# Patient Record
Sex: FEMALE | Race: WHITE | NOT HISPANIC OR LATINO | Employment: UNEMPLOYED | URBAN - NONMETROPOLITAN AREA
[De-identification: names, ages, dates, MRNs, and addresses within clinical notes are randomized per-mention and may not be internally consistent; named-entity substitution may affect disease eponyms.]

---

## 2020-08-20 ENCOUNTER — APPOINTMENT (EMERGENCY)
Dept: RADIOLOGY | Facility: HOSPITAL | Age: 55
End: 2020-08-20
Payer: COMMERCIAL

## 2020-08-20 ENCOUNTER — APPOINTMENT (EMERGENCY)
Dept: CT IMAGING | Facility: HOSPITAL | Age: 55
End: 2020-08-20
Payer: COMMERCIAL

## 2020-08-20 ENCOUNTER — HOSPITAL ENCOUNTER (EMERGENCY)
Facility: HOSPITAL | Age: 55
Discharge: HOME | End: 2020-08-20
Attending: EMERGENCY MEDICINE | Admitting: EMERGENCY MEDICINE
Payer: COMMERCIAL

## 2020-08-20 VITALS
OXYGEN SATURATION: 99 % | BODY MASS INDEX: 27.14 KG/M2 | HEIGHT: 66 IN | SYSTOLIC BLOOD PRESSURE: 128 MMHG | TEMPERATURE: 97.9 F | DIASTOLIC BLOOD PRESSURE: 78 MMHG | HEART RATE: 82 BPM | WEIGHT: 168.87 LBS | RESPIRATION RATE: 18 BRPM

## 2020-08-20 DIAGNOSIS — S29.9XXA RIB INJURY: ICD-10-CM

## 2020-08-20 DIAGNOSIS — S09.90XA HEAD INJURY: Primary | ICD-10-CM

## 2020-08-20 PROCEDURE — 99284 EMERGENCY DEPT VISIT MOD MDM: CPT

## 2020-08-20 PROCEDURE — G1004 CDSM NDSC: HCPCS

## 2020-08-20 PROCEDURE — 71046 X-RAY EXAM CHEST 2 VIEWS: CPT

## 2020-08-20 PROCEDURE — 99284 EMERGENCY DEPT VISIT MOD MDM: CPT | Performed by: EMERGENCY MEDICINE

## 2020-08-20 PROCEDURE — 70450 CT HEAD/BRAIN W/O DYE: CPT

## 2020-08-20 RX ORDER — ACETAMINOPHEN 325 MG/1
975 TABLET ORAL ONCE
Status: COMPLETED | OUTPATIENT
Start: 2020-08-20 | End: 2020-08-20

## 2020-08-20 RX ORDER — LIDOCAINE 50 MG/G
1 PATCH TOPICAL DAILY
Qty: 10 PATCH | Refills: 0 | Status: SHIPPED | OUTPATIENT
Start: 2020-08-20 | End: 2021-06-01

## 2020-08-20 RX ORDER — LEVOTHYROXINE SODIUM 0.15 MG/1
TABLET ORAL
COMMUNITY
Start: 2020-04-18

## 2020-08-20 RX ORDER — LIDOCAINE 50 MG/G
PATCH TOPICAL
COMMUNITY
Start: 2020-07-10 | End: 2021-06-01

## 2020-08-20 RX ORDER — ATORVASTATIN CALCIUM 40 MG/1
40 TABLET, FILM COATED ORAL DAILY
COMMUNITY
Start: 2020-05-25

## 2020-08-20 RX ORDER — GABAPENTIN 300 MG/1
CAPSULE ORAL
COMMUNITY
Start: 2020-04-18

## 2020-08-20 RX ORDER — FAMOTIDINE 20 MG/1
20 TABLET, FILM COATED ORAL 2 TIMES DAILY
COMMUNITY

## 2020-08-20 RX ORDER — MORPHINE SULFATE 15 MG/1
15 TABLET ORAL EVERY 8 HOURS PRN
Qty: 8 TABLET | Refills: 0 | Status: SHIPPED | OUTPATIENT
Start: 2020-08-20 | End: 2021-06-01

## 2020-08-20 RX ADMIN — ACETAMINOPHEN 975 MG: 325 TABLET ORAL at 10:59

## 2020-08-20 NOTE — Clinical Note
spouse accompanied Servando Marcelino to the emergency department on 8/20/2020  They may return to work on 08/21/2020  If you have any questions or concerns, please don't hesitate to call        Silke Contreras, DO

## 2020-08-20 NOTE — ED NOTES
Pt amb to BR with this RN, gait is slow and steady, pt denies feeling dizzy or lightheaded, returned to room, resting comfortably on litter, no distress noted, sig other at bedside       Adry Pulliam RN  08/20/20 3393

## 2020-11-30 NOTE — ED PROVIDER NOTES
History  Chief Complaint   Patient presents with    Fall     pt was stepping over dog, lost balance falling on left side hitting head and ribs 1hr ago  pt c/o left head pain w/abrasion and left rib pain  hx recent rib injury  denies loc/travel/sob/cough/fevers/n/v/d     Left ribs still hurt after fall about a month ago and reinjured when she fell over dog this morning and also bumped head, no prodrome, little dizziness, notes she is taking Tylenol Arthritis for left ankle pain chronically which also helps her rib discomfort, initially seen by family physician and had a chest x-ray, given pain prescription which she used common no other complaints      History provided by:  Patient and spouse  Medical Problem   Location:  Left ribs  Quality:  Ache, sharp  Onset quality:  Sudden  Timing:  Constant  Progression:  Partially resolved  Chronicity:  New  Associated symptoms: headaches    Associated symptoms: no abdominal pain, no cough, no diarrhea, no fever, no loss of consciousness, no rash, no shortness of breath, no sore throat and no vomiting        Prior to Admission Medications   Prescriptions Last Dose Informant Patient Reported? Taking?   atorvastatin (LIPITOR) 40 mg tablet   Yes Yes   Sig: Take 40 mg by mouth daily   famotidine (PEPCID) 20 mg tablet   Yes No   Sig: Take 20 mg by mouth 2 (two) times a day   gabapentin (NEURONTIN) 300 mg capsule   Yes Yes   Sig: TAKE 1 CAPSULE BY MOUTH IN THE MORNING AND AFTERNOON AND 2 CAPSULES AT BEDTIME   levothyroxine 150 mcg tablet   Yes Yes   Sig: TAKE 1 TABLET BY MOUTH EVERY DAY AT LEAST 30 MINUTES PRIOR TO BREAKFAST OR OTHER MEDS   lidocaine (LIDODERM) 5 %   Yes Yes   Sig: PLACE 1 PATCH TOPICALLY ON THE SKIN DAILY  FOR RIB FRACTURE      Facility-Administered Medications: None       History reviewed  No pertinent past medical history  History reviewed  No pertinent surgical history  History reviewed  No pertinent family history    I have reviewed and agree with the history as documented  E-Cigarette/Vaping    E-Cigarette Use Never User      E-Cigarette/Vaping Substances     Social History     Tobacco Use    Smoking status: Current Every Day Smoker     Packs/day: 0 50     Types: Cigarettes    Smokeless tobacco: Never Used   Substance Use Topics    Alcohol use: Yes     Frequency: Monthly or less     Drinks per session: 1 or 2     Binge frequency: Less than monthly    Drug use: Never       Review of Systems   Constitutional: Negative for fever  HENT: Negative for sore throat  Respiratory: Negative for cough and shortness of breath  Gastrointestinal: Negative for abdominal pain, diarrhea and vomiting  Skin: Negative for rash  Neurological: Positive for headaches  Negative for loss of consciousness  All other systems reviewed and are negative  Physical Exam  Physical Exam  Vitals signs and nursing note reviewed  Constitutional:       Comments: Pleasant, comfortable-appearing   HENT:      Head: Normocephalic and atraumatic  Eyes:      Conjunctiva/sclera: Conjunctivae normal       Pupils: Pupils are equal, round, and reactive to light  Neck:      Musculoskeletal: Neck supple  Cardiovascular:      Rate and Rhythm: Normal rate and regular rhythm  Heart sounds: Normal heart sounds  Pulmonary:      Effort: Pulmonary effort is normal       Breath sounds: Normal breath sounds  Abdominal:      General: Bowel sounds are normal  There is no distension  Palpations: Abdomen is soft  Tenderness: There is no abdominal tenderness  Musculoskeletal: Normal range of motion  Comments: Left anterior inferior ribs with overlying pain patch are locally tender, without deformity   Skin:     General: Skin is warm and dry  Neurological:      Mental Status: She is alert and oriented to person, place, and time  Cranial Nerves: No cranial nerve deficit        Coordination: Coordination normal    Psychiatric:         Behavior: Behavior normal  Thought Content: Thought content normal          Judgment: Judgment normal          Vital Signs  ED Triage Vitals [08/20/20 0917]   Temperature Pulse Respirations Blood Pressure SpO2   97 9 °F (36 6 °C) 74 18 153/88 99 %      Temp Source Heart Rate Source Patient Position - Orthostatic VS BP Location FiO2 (%)   Temporal Monitor Lying Left arm --      Pain Score       7           Vitals:    08/20/20 0917 08/20/20 1100   BP: 153/88 128/78   Pulse: 74 82   Patient Position - Orthostatic VS: Lying Lying         Visual Acuity  Visual Acuity      Most Recent Value   L Pupil Size (mm)  3   R Pupil Size (mm)  3          ED Medications  Medications   acetaminophen (TYLENOL) tablet 975 mg (975 mg Oral Given 8/20/20 1059)       Diagnostic Studies  Results Reviewed     None                 XR chest 2 views   Final Result by Arty Dandy, MD (08/20 1008)      Question minimally displaced fracture of the lateral left 6th rib with no pneumothorax or hemothorax  The study was marked in [facility] for immediate notification  Workstation performed: [id number]         CT head without contrast   Final Result by Ralf Sanchez MD (08/20 1021)      No acute intracranial abnormality  Workstation performed: [id number]                    Procedures  Procedures         ED Course  ED Course as of Aug 20 1105   Thu Aug 20, 2020   1054 IMPRESSION:     Question minimally displaced fracture of the lateral left 6th rib with no pneumothorax or hemothorax      The study was marked in [employer] for immediate notification          XR chest 2 views   1104 We discussed xr results and close outpatient f/u, spouse present and voices good understanding, requests more lidoderm          US AUDIT      Most Recent Value   Initial Alcohol Screen: US AUDIT-C    1  How often do you have a drink containing alcohol? 1 Filed at: 08/20/2020 0918   2   How many drinks containing alcohol do you have on a typical day you are drinking?   0 Filed at: 08/20/2020 0918   3b  FEMALE Any Age, or MALE 65+: How often do you have 4 or more drinks on one occassion? 1 Filed at: 08/20/2020 1802   Audit-C Score  2 Filed at: 08/20/2020 1792                  ZACKARY/DAST-10      Most Recent Value   How many times in the past year have you    Used an illegal drug or used a prescription medication for non-medical reasons? Never Filed at: 08/20/2020 9859                                MDM      Disposition  Final diagnoses:   Head injury   Rib injury     Time reflects when diagnosis was documented in both MDM as applicable and the Disposition within this note     Time User Action Codes Description Comment    8/20/2020 10:59 AM Clarissa Newby Add [S09 90XA] Head injury     8/20/2020 11:00 AM Clarissa Newby Add [S29  9XXA] Rib injury       ED Disposition     ED Disposition Condition Date/Time Comment    Discharge Stable Thu Aug 20, 2020 10:59 AM Kriste Cooks discharge to home/self care  Follow-up Information     Follow up With Specialties Details Why Contact Info    Suzy Shaw MD Internal Medicine   [address] [place] [address]  [phone]            Patient's Medications   Discharge Prescriptions    LIDOCAINE (LIDODERM) 5 %    Apply 1 patch topically daily for 5 days Remove & Discard patch within 12 hours or as directed by MD       Start Date: 8/20/2020 End Date: 8/25/2020       Order Dose: 1 patch       Quantity: 10 patch    Refills: 0    MORPHINE (MSIR) 15 MG TABLET    Take 1 tablet (15 mg total) by mouth every 8 (eight) hours as needed for severe pain for up to 3 dosesMax Daily Amount: 45 mg       Start Date: 8/20/2020 End Date: --       Order Dose: 15 mg       Quantity: 8 tablet    Refills: 0     No discharge procedures on file      PDMP Review     None          ED Provider  Electronically Signed by           Denisse Martinez DO  08/20/20 6703 [employer] beliefs/[employer]

## 2021-06-01 ENCOUNTER — HOSPITAL ENCOUNTER (EMERGENCY)
Facility: HOSPITAL | Age: 56
Discharge: HOME | End: 2021-06-01
Attending: EMERGENCY MEDICINE
Payer: COMMERCIAL

## 2021-06-01 ENCOUNTER — APPOINTMENT (EMERGENCY)
Dept: RADIOLOGY | Facility: HOSPITAL | Age: 56
End: 2021-06-01
Payer: COMMERCIAL

## 2021-06-01 VITALS
WEIGHT: 171.2 LBS | OXYGEN SATURATION: 97 % | HEART RATE: 89 BPM | DIASTOLIC BLOOD PRESSURE: 69 MMHG | RESPIRATION RATE: 18 BRPM | HEIGHT: 66 IN | BODY MASS INDEX: 27.51 KG/M2 | TEMPERATURE: 97.3 F | SYSTOLIC BLOOD PRESSURE: 137 MMHG

## 2021-06-01 DIAGNOSIS — S62.652A CLOSED NONDISPLACED FRACTURE OF MIDDLE PHALANX OF RIGHT MIDDLE FINGER, INITIAL ENCOUNTER: Primary | ICD-10-CM

## 2021-06-01 PROCEDURE — 73130 X-RAY EXAM OF HAND: CPT

## 2021-06-01 PROCEDURE — 29125 APPL SHORT ARM SPLINT STATIC: CPT | Performed by: EMERGENCY MEDICINE

## 2021-06-01 PROCEDURE — 99284 EMERGENCY DEPT VISIT MOD MDM: CPT | Performed by: EMERGENCY MEDICINE

## 2021-06-01 PROCEDURE — 99283 EMERGENCY DEPT VISIT LOW MDM: CPT

## 2021-06-01 RX ORDER — OXYCODONE HYDROCHLORIDE AND ACETAMINOPHEN 5; 325 MG/1; MG/1
1 TABLET ORAL EVERY 8 HOURS PRN
Qty: 10 TABLET | Refills: 0 | Status: SHIPPED | OUTPATIENT
Start: 2021-06-01 | End: 2021-06-11

## 2021-06-01 NOTE — ED PROVIDER NOTES
History  Chief Complaint   Patient presents with    Finger Injury     pt states at 1300 she was trying to get her dog into the house, put her fingers under its collar, and the dog pulled her  c/o right 3rd and 4th finger pain with swelling since then      Patient complains of right 3rd finger pain after she grabbed her dog by the collar and he pulled her hand  She complains of pain in the middle and ring fingers  No other injuries or complaints  History provided by:  Patient   used: No    Hand Pain  Location:  Right middle and ring fingers  Quality:  Pain and swelling  Severity:  Moderate  Onset quality:  Sudden  Timing:  Constant  Progression:  Unchanged  Chronicity:  New  Relieved by:  Nothing  Worsened by:  Palpation, movement  Associated symptoms: no abdominal pain, no chest pain, no congestion, no cough, no diarrhea, no ear pain, no fatigue, no fever, no headaches, no loss of consciousness, no nausea, no rash, no shortness of breath, no sore throat, no vomiting and no wheezing        Prior to Admission Medications   Prescriptions Last Dose Informant Patient Reported? Taking?   atorvastatin (LIPITOR) 40 mg tablet   Yes No   Sig: Take 40 mg by mouth daily   famotidine (PEPCID) 20 mg tablet   Yes No   Sig: Take 20 mg by mouth 2 (two) times a day   gabapentin (NEURONTIN) 300 mg capsule   Yes No   Sig: TAKE 1 CAPSULE BY MOUTH IN THE MORNING AND AFTERNOON AND 2 CAPSULES AT BEDTIME   levothyroxine 150 mcg tablet   Yes No   Sig: TAKE 1 TABLET BY MOUTH EVERY DAY AT LEAST 30 MINUTES PRIOR TO BREAKFAST OR OTHER MEDS      Facility-Administered Medications: None       Past Medical History:   Diagnosis Date    Disease of thyroid gland     GERD (gastroesophageal reflux disease)     High cholesterol        Past Surgical History:   Procedure Laterality Date    ANKLE SURGERY Left     FOOT SURGERY Left     HYSTERECTOMY      SHOULDER SURGERY Right        History reviewed   No pertinent family history  I have reviewed and agree with the history as documented  E-Cigarette/Vaping    E-Cigarette Use Never User      E-Cigarette/Vaping Substances     Social History     Tobacco Use    Smoking status: Current Every Day Smoker     Packs/day: 0 50     Types: Cigarettes    Smokeless tobacco: Never Used   Substance Use Topics    Alcohol use: Not Currently     Frequency: Monthly or less     Drinks per session: 1 or 2     Binge frequency: Less than monthly    Drug use: Never       Review of Systems   Constitutional: Negative for chills, fatigue and fever  HENT: Negative for congestion, ear pain, hearing loss, sore throat, trouble swallowing and voice change  Eyes: Negative for pain and discharge  Respiratory: Negative for cough, shortness of breath and wheezing  Cardiovascular: Negative for chest pain and palpitations  Gastrointestinal: Negative for abdominal pain, blood in stool, constipation, diarrhea, nausea and vomiting  Genitourinary: Negative for dysuria, flank pain, frequency and hematuria  Musculoskeletal: Negative for joint swelling, neck pain and neck stiffness  Skin: Negative for rash and wound  Neurological: Negative for dizziness, seizures, loss of consciousness, syncope, facial asymmetry and headaches  Psychiatric/Behavioral: Negative for hallucinations, self-injury and suicidal ideas  All other systems reviewed and are negative  Physical Exam  Physical Exam  Vitals signs and nursing note reviewed  Constitutional:       General: She is not in acute distress  Appearance: She is well-developed  She is not ill-appearing or diaphoretic  HENT:      Head: Normocephalic and atraumatic  Right Ear: External ear normal       Left Ear: External ear normal    Eyes:      General: No scleral icterus  Right eye: No discharge  Left eye: No discharge  Extraocular Movements: Extraocular movements intact        Conjunctiva/sclera: Conjunctivae normal  Neck:      Musculoskeletal: Normal range of motion and neck supple  Pulmonary:      Effort: Pulmonary effort is normal  No respiratory distress  Musculoskeletal: Normal range of motion  General: No deformity or signs of injury  Comments: Right 3rd and 4th digits are swollen  Right 3rd digit is tender to palpation  Movement in these digits is limited by pain  Distal neurovascular function is normal   Remainder of the hand is normal    Skin:     General: Skin is warm and dry  Coloration: Skin is not jaundiced or pale  Neurological:      General: No focal deficit present  Mental Status: She is alert and oriented to person, place, and time  Cranial Nerves: No cranial nerve deficit  Coordination: Coordination normal       Gait: Gait normal    Psychiatric:         Mood and Affect: Mood normal          Behavior: Behavior normal          Thought Content: Thought content normal          Judgment: Judgment normal          Vital Signs  ED Triage Vitals [06/01/21 1621]   Temperature Pulse Respirations Blood Pressure SpO2   (!) 97 3 °F (36 3 °C) 89 18 137/69 97 %      Temp Source Heart Rate Source Patient Position - Orthostatic VS BP Location FiO2 (%)   Temporal Monitor Sitting Right arm --      Pain Score       --           Vitals:    06/01/21 1621   BP: 137/69   Pulse: 89   Patient Position - Orthostatic VS: Sitting         Visual Acuity      ED Medications  Medications - No data to display    Diagnostic Studies  Results Reviewed     None                 XR hand 3+ views RIGHT   ED Interpretation by Dona Rosen MD (06/01 3554)                 Procedures  Splint application    Date/Time: 6/1/2021 4:44 PM  Performed by: Dona Rosen MD  Authorized by: Dona Rosen MD   Universal Protocol:  Consent: Verbal consent obtained  Written consent not obtained    Consent given by: patient  Patient identity confirmed: verbally with patient and arm band      Procedure details: Laterality:  Right    Location:  Hand    Hand:  R hand    Strapping: no      Splint type:  Short arm splint, static (forearm to hand)    Supplies:  Cotton padding, Ortho-Glass and 2 layer wrap  Post-procedure details:     Pain:  Unchanged    Sensation:  Normal    Patient tolerance of procedure: Tolerated well, no immediate complications             ED Course                                           MDM  Number of Diagnoses or Management Options     Amount and/or Complexity of Data Reviewed  Tests in the radiology section of CPT®: ordered and reviewed        Disposition  Final diagnoses:   Closed nondisplaced fracture of middle phalanx of right middle finger, initial encounter     Time reflects when diagnosis was documented in both MDM as applicable and the Disposition within this note     Time User Action Codes Description Comment    6/1/2021  5:00 PM Montez Beaver Add [E20 961Y] Closed nondisplaced fracture of middle phalanx of right middle finger, initial encounter       ED Disposition     ED Disposition Condition Date/Time Comment    Discharge Stable Tue Jun 1, 2021  4:59 PM Zoe Hurtaod discharge to home/self care  Follow-up Information     Follow up With Specialties Details Why Contact Info    Dami Massey MD Internal Medicine   [address] Orthopedic Surgery In 2 days  [address] [place] [address]  [phone]            Patient's Medications   Discharge Prescriptions    OXYCODONE-ACETAMINOPHEN (PERCOCET) 5-325 MG PER TABLET    Take 1 tablet by mouth every 8 (eight) hours as needed for moderate pain for up to 10 daysMax Daily Amount: 3 tablets       Start Date: 6/1/2021  End Date: 6/11/2021       Order Dose: 1 tablet       Quantity: 10 tablet    Refills: 0     No discharge procedures on file      PDMP Review     None          ED Provider  Electronically Signed by           Serenity Brody MD  06/01/21 6428

## 2021-06-02 ENCOUNTER — OFFICE VISIT (OUTPATIENT)
Dept: OBGYN CLINIC | Facility: CLINIC | Age: 56
End: 2021-06-02
Payer: COMMERCIAL

## 2021-06-02 VITALS
BODY MASS INDEX: 27.51 KG/M2 | WEIGHT: 171.2 LBS | SYSTOLIC BLOOD PRESSURE: 118 MMHG | DIASTOLIC BLOOD PRESSURE: 60 MMHG | HEIGHT: 66 IN | HEART RATE: 90 BPM

## 2021-06-02 DIAGNOSIS — M79.644 FINGER PAIN, RIGHT: ICD-10-CM

## 2021-06-02 DIAGNOSIS — S62.652A CLOSED NONDISPLACED FRACTURE OF MIDDLE PHALANX OF RIGHT MIDDLE FINGER, INITIAL ENCOUNTER: Primary | ICD-10-CM

## 2021-06-02 PROCEDURE — 99203 OFFICE O/P NEW LOW 30 MIN: CPT | Performed by: ORTHOPAEDIC SURGERY

## 2021-06-02 NOTE — PROGRESS NOTES
ASSESSMENT/PLAN:    Diagnoses and all orders for this visit:    Finger pain, right    Closed nondisplaced fracture of middle phalanx of right middle finger, initial encounter          X-rays performed in the ED reviewed with the patient  Index and middle fingers of the right hand were buddy-taped together  The buddy tape should remain at all times but she may change the tape as needed  She was instructed on how to safely do so today  We will see her back in 1 week for recheck  X-rays of the middle finger will be obtained upon arrival   She was encouraged to contact the office should questions or concerns arise  Return in about 1 week (around 6/9/2021)  _____________________________________________________  CHIEF COMPLAINT:  Chief Complaint   Patient presents with   [address] [place] [address] Patient Visit     went to grab her dog by the collar and her right fingers are swollen and bruised  states that her middle finger is broken  SUBJECTIVE:  Thomas King is a 50 y o  female who presents for evaluation of right finger fracture  She was grabbing her dog by the collar yesterday morning when he pulled her  She noted   Pain in the middle finger and progressive swelling that worsened throughout the day  She presented to the ED  In the late afternoon for evaluation  X-rays were obtained and were read positive for fracture  She was put into a splint and asked to follow up with Orthopedics  Today, she states she has continued pain in the finger  She has been using OTC analgesics  She was prescribed Percocet by the ED but her insurance requires a prior off of for approving it  She reports a prior boxer's fracture in the  Right 5th metacarpal more than 20 years ago  She denies numbness or tingling  She is left-hand dominant      PAST MEDICAL HISTORY:  Past Medical History:   Diagnosis Date    Disease of thyroid gland     GERD (gastroesophageal reflux disease)     High cholesterol        PAST SURGICAL HISTORY:  Past Surgical History:   Procedure Laterality Date    ANKLE SURGERY Left     FOOT SURGERY Left     HYSTERECTOMY      SHOULDER SURGERY Right        FAMILY HISTORY:  History reviewed  No pertinent family history  SOCIAL HISTORY:  Social History     Tobacco Use    Smoking status: Current Every Day Smoker     Packs/day: 0 50     Types: Cigarettes    Smokeless tobacco: Never Used   Substance Use Topics    Alcohol use: Not Currently     Frequency: Monthly or less     Drinks per session: 1 or 2     Binge frequency: Less than monthly    Drug use: Never       MEDICATIONS:    Current Outpatient Medications:     atorvastatin (LIPITOR) 40 mg tablet, Take 40 mg by mouth daily, Disp: , Rfl:     famotidine (PEPCID) 20 mg tablet, Take 20 mg by mouth 2 (two) times a day, Disp: , Rfl:     gabapentin (NEURONTIN) 300 mg capsule, TAKE 1 CAPSULE BY MOUTH IN THE MORNING AND AFTERNOON AND 2 CAPSULES AT BEDTIME, Disp: , Rfl:     levothyroxine 150 mcg tablet, TAKE 1 TABLET BY MOUTH EVERY DAY AT LEAST 30 MINUTES PRIOR TO BREAKFAST OR OTHER MEDS, Disp: , Rfl:     oxyCODONE-acetaminophen (PERCOCET) 5-325 mg per tablet, Take 1 tablet by mouth every 8 (eight) hours as needed for moderate pain for up to 10 daysMax Daily Amount: 3 tablets, Disp: 10 tablet, Rfl: 0    ALLERGIES:  No Known Allergies    Review of systems:   Constitutional: Negative for fatigue, fever or loss of apetite  HENT: Negative  Respiratory: Negative for shortness of breath, dyspnea  Cardiovascular: Negative for chest pain/tightness  Gastrointestinal: Negative for abdominal pain, N/V  Endocrine: Negative for cold/heat intolerance, unexplained weight loss/gain  Genitourinary: Negative for flank pain, dysuria, hematuria  Musculoskeletal:  positive as stated in the HPI  Skin: Negative for rash  Neurological:   Negative for numbness and tingling     Psychiatric/Behavioral: Negative for agitation  _____________________________________________________  PHYSICAL EXAMINATION:    Blood pressure 118/60, pulse 90, height 5' 6" (1 676 m), weight 77 7 kg (171 lb 3 2 oz)  General: well developed and well nourished, alert, oriented times 3 and appears comfortable  Psychiatric: Normal  HEENT: Benign  Cardiovascular: Regular    Pulmonary: No wheezing or stridor  Abdomen: Soft, Nontender  Skin: No masses, erythema, lacerations, fluctation, ulcerations  Neurovascular: Motor and sensory exams are grossly intact, 2+ radial pulse  Limb is warm well perfused with good color and capillary refill of the digits  MUSCULOSKELETAL EXAMINATION:     the right hand exam demonstrates a Mitten splint to be in place upon arrival, this was removed without difficulty  There is significant swelling and ecchymosis of the middle  Finger  She has tenderness to palpation over the middle phalanx  There is a very minimal degree of rotational deformity of the middle digit in comparison to the contralateral left side  She is unable to make a tight fist secondary to pain in the middle finger  The remainder of the upper extremity exam, bilaterally, is benign  _____________________________________________________  STUDIES REVIEWED:  I have personally reviewed pertinent films and reports in PACS  X-rays of the right hand performed yesterday in the ED demonstrate an acute oblique fracture of the middle phalanx of the middle finger  PROCEDURES PERFORMED:  Procedures  None performed today      Elpidio Vee

## 2021-06-09 ENCOUNTER — HOSPITAL ENCOUNTER (OUTPATIENT)
Dept: RADIOLOGY | Facility: CLINIC | Age: 56
Discharge: HOME | End: 2021-06-09
Payer: COMMERCIAL

## 2021-06-09 ENCOUNTER — OFFICE VISIT (OUTPATIENT)
Dept: OBGYN CLINIC | Facility: CLINIC | Age: 56
End: 2021-06-09
Payer: COMMERCIAL

## 2021-06-09 VITALS
HEIGHT: 66 IN | BODY MASS INDEX: 27.48 KG/M2 | HEART RATE: 83 BPM | TEMPERATURE: 96.3 F | DIASTOLIC BLOOD PRESSURE: 70 MMHG | WEIGHT: 171 LBS | SYSTOLIC BLOOD PRESSURE: 110 MMHG

## 2021-06-09 DIAGNOSIS — S62.652D CLOSED NONDISPLACED FRACTURE OF MIDDLE PHALANX OF RIGHT MIDDLE FINGER WITH ROUTINE HEALING, SUBSEQUENT ENCOUNTER: Primary | ICD-10-CM

## 2021-06-09 DIAGNOSIS — S62.652D CLOSED NONDISPLACED FRACTURE OF MIDDLE PHALANX OF RIGHT MIDDLE FINGER WITH ROUTINE HEALING, SUBSEQUENT ENCOUNTER: ICD-10-CM

## 2021-06-09 PROCEDURE — 99213 OFFICE O/P EST LOW 20 MIN: CPT | Performed by: ORTHOPAEDIC SURGERY

## 2021-06-09 PROCEDURE — 73140 X-RAY EXAM OF FINGER(S): CPT

## 2021-06-09 NOTE — PROGRESS NOTES
Patient Name:  Bryanna Barrios  MRN:  [id number]    Assessment     1  Closed nondisplaced fracture of middle phalanx of right middle finger with routine healing, subsequent encounter  XR finger right third digit-middle       Plan     1  Return in about 2 weeks (around 6/23/2021)  X-rays will be obtained at follow-up to ensure the fracture continues to heal appropriately  She is to work on range of motion  If there is not an improvement in her motion at follow-up, a formal course of physical therapy would be indicated  This was discussed today but she would prefer to avoid therapy if possible  Subjective   Bryanna Barrios returns for follow-up of  Right long finger middle phalanx fracture  The patient is 1 week(s) post  injury and returns for routine follow-up  Patient  Reports that she has been consistent with tuyet taping as previously instructed  She reports significant pain in the middle phalanx of the right long finger  She says that she has been attempting to perform regular range of motion exercises, but her fingers for a painful, she admits that she has not been able to tolerate doing much  She denies any numbness or tingling  She is currently taking oxycodone at night to rest, reports pain in the finger during the day  Overall, she does not believe there has been any improvement since the date of injury  Objective     /70   Pulse 83   Temp (!) 96 3 °F (35 7 °C) (Temporal)   Ht 5' 6" (1 676 m)   Wt 77 6 kg (171 lb)   BMI 27 60 kg/m²       Patient presents with tuyet taping in place  There is generalized soft tissue swelling in the proximal, middle, and distal phalanx of the long finger  She is exquisitely tender to palpation over the middle phalanx  FDS, FDP, and extensor mechanisms are intact  She demonstrates very limited active motion of the PIP and the DIP joint  2+ distal radial pulse with brisk capillary refill to the fingers    Radial, median, and ulnar motor and sensory distributions intact  Sensation light touch intact distally        Data Review      Attending Physician has personally reviewed pertinent imaging in PACS, impression is as follows:    Review of radiographic series taken 6/9/2021 of the  Right hand/long finger shows visible spiral fracture of middle phalanx with well-maintained positioning as compared to previous imaging    Scribe Attestation    I,:  Meliza Romano am acting as a scribe while in the presence of the attending physician :       I,:  Kristin Longo personally performed the services described in this documentation    as scribed in my presence :

## 2021-06-23 ENCOUNTER — HOSPITAL ENCOUNTER (OUTPATIENT)
Dept: RADIOLOGY | Facility: CLINIC | Age: 56
Discharge: HOME | End: 2021-06-23
Payer: COMMERCIAL

## 2021-06-23 ENCOUNTER — OFFICE VISIT (OUTPATIENT)
Dept: OBGYN CLINIC | Facility: CLINIC | Age: 56
End: 2021-06-23
Payer: COMMERCIAL

## 2021-06-23 VITALS
BODY MASS INDEX: 27.48 KG/M2 | DIASTOLIC BLOOD PRESSURE: 80 MMHG | SYSTOLIC BLOOD PRESSURE: 130 MMHG | HEART RATE: 78 BPM | TEMPERATURE: 97.9 F | HEIGHT: 66 IN | WEIGHT: 171 LBS

## 2021-06-23 DIAGNOSIS — S62.652D CLOSED NONDISPLACED FRACTURE OF MIDDLE PHALANX OF RIGHT MIDDLE FINGER WITH ROUTINE HEALING, SUBSEQUENT ENCOUNTER: ICD-10-CM

## 2021-06-23 DIAGNOSIS — S62.652D CLOSED NONDISPLACED FRACTURE OF MIDDLE PHALANX OF RIGHT MIDDLE FINGER WITH ROUTINE HEALING, SUBSEQUENT ENCOUNTER: Primary | ICD-10-CM

## 2021-06-23 PROCEDURE — 99213 OFFICE O/P EST LOW 20 MIN: CPT | Performed by: ORTHOPAEDIC SURGERY

## 2021-06-23 PROCEDURE — 73140 X-RAY EXAM OF FINGER(S): CPT

## 2021-06-23 NOTE — PROGRESS NOTES
ASSESSMENT/PLAN:    Diagnoses and all orders for this visit:    Closed nondisplaced fracture of middle phalanx of right middle finger with routine healing, subsequent encounter  -     XR finger right third digit-middle; Future        Plan:  I would recommend follow-up in 2 weeks  X-rays of her finger will once again be obtained  The fingers to remain tuyet-taped to the index finger, possibly the ring finger as well  However, I would strongly recommend at least taping to the index finger  She is to work on range of motion as instructed  I have instructed her to contact me if questions or concerns arise  Return in about 2 weeks (around 7/7/2021)  _____________________________________________________  CHIEF COMPLAINT:  Chief Complaint   Patient presents with    Follow-up         SUBJECTIVE:  Cathie Godoy is a 50y o  year old female who presents for follow up of her right long finger fracture  She is doing somewhat better but continues to note pain in the finger  She continues to have difficulty with motion and continues to note swelling  PAST MEDICAL HISTORY:  Past Medical History:   Diagnosis Date    Disease of thyroid gland     GERD (gastroesophageal reflux disease)     High cholesterol        PAST SURGICAL HISTORY:  Past Surgical History:   Procedure Laterality Date    ANKLE SURGERY Left     FOOT SURGERY Left     HYSTERECTOMY      SHOULDER SURGERY Right        FAMILY HISTORY:  History reviewed  No pertinent family history      SOCIAL HISTORY:  Social History     Tobacco Use    Smoking status: Current Every Day Smoker     Packs/day: 0 50     Types: Cigarettes    Smokeless tobacco: Never Used   Vaping Use    Vaping Use: Never used   Substance Use Topics    Alcohol use: Not Currently    Drug use: Never       MEDICATIONS:    Current Outpatient Medications:     atorvastatin (LIPITOR) 40 mg tablet, Take 40 mg by mouth daily, Disp: , Rfl:     famotidine (PEPCID) 20 mg tablet, Take 20 mg by mouth 2 (two) times a day, Disp: , Rfl:     gabapentin (NEURONTIN) 300 mg capsule, TAKE 1 CAPSULE BY MOUTH IN THE MORNING AND AFTERNOON AND 2 CAPSULES AT BEDTIME, Disp: , Rfl:     levothyroxine 150 mcg tablet, TAKE 1 TABLET BY MOUTH EVERY DAY AT LEAST 30 MINUTES PRIOR TO BREAKFAST OR OTHER MEDS, Disp: , Rfl:     ALLERGIES:  No Known Allergies    REVIEW OF SYSTEMS:  Pertinent items are noted in HPI  A comprehensive review of systems was negative       _____________________________________________________  PHYSICAL EXAMINATION:  General: alert, oriented times 3 and appears comfortable  Psychiatric: Normal  HEENT:  Normocephalic, atraumatic  Cardiovascular:  Regular  Pulmonary: No wheezing or stridor  Skin: No masses, erthema, lacerations, fluctation, ulcerations  Neurovascular: Motor and sensory exams are intact and pulses are palpable  Good color and capillary refill is noted  MUSCULOSKELETAL EXAMINATION:    The right long finger demonstrates tenderness and swelling at the middle and distal phalanges  She has pain with PIP flexion and D IP flexion but no complaints during MCP flexion  Range of motion is somewhat limited consistent with her injury  There is no gross deformity to visual inspection  The PIP joint of the ring finger is somewhat tender but there is no gross deformity  There is no swelling  The remainder of the right hand exam is benign  _____________________________________________________  STUDIES REVIEWED:  X-rays of her hand demonstrate no evidence of injury to the ring finger  The fracture of the middle phalanx of the long finger is in good alignment with early evidence of healing          Myrtle Arce

## 2021-07-07 ENCOUNTER — OFFICE VISIT (OUTPATIENT)
Dept: OBGYN CLINIC | Facility: CLINIC | Age: 56
End: 2021-07-07
Payer: COMMERCIAL

## 2021-07-07 ENCOUNTER — HOSPITAL ENCOUNTER (OUTPATIENT)
Dept: RADIOLOGY | Facility: CLINIC | Age: 56
Discharge: HOME | End: 2021-07-07
Payer: COMMERCIAL

## 2021-07-07 VITALS
DIASTOLIC BLOOD PRESSURE: 70 MMHG | HEART RATE: 74 BPM | TEMPERATURE: 96.8 F | HEIGHT: 66 IN | WEIGHT: 171 LBS | SYSTOLIC BLOOD PRESSURE: 118 MMHG | BODY MASS INDEX: 27.48 KG/M2

## 2021-07-07 DIAGNOSIS — S62.652D CLOSED NONDISPLACED FRACTURE OF MIDDLE PHALANX OF RIGHT MIDDLE FINGER WITH ROUTINE HEALING, SUBSEQUENT ENCOUNTER: Primary | ICD-10-CM

## 2021-07-07 DIAGNOSIS — S62.652D CLOSED NONDISPLACED FRACTURE OF MIDDLE PHALANX OF RIGHT MIDDLE FINGER WITH ROUTINE HEALING, SUBSEQUENT ENCOUNTER: ICD-10-CM

## 2021-07-07 PROCEDURE — 99213 OFFICE O/P EST LOW 20 MIN: CPT | Performed by: ORTHOPAEDIC SURGERY

## 2021-07-07 PROCEDURE — 73140 X-RAY EXAM OF FINGER(S): CPT

## 2021-07-07 NOTE — PROGRESS NOTES
ASSESSMENT/PLAN:    Diagnoses and all orders for this visit:    Closed nondisplaced fracture of middle phalanx of right middle finger with routine healing, subsequent encounter  -     XR finger right third digit-middle; Future          Plan:  I would recommend follow-up in 3 weeks  X-rays will be obtained only if clinically indicated  She is to continue working on range of motion  I have encouraged her to contact the office if questions or concerns arise  Return in about 3 weeks (around 7/28/2021)  _____________________________________________________  CHIEF COMPLAINT:  Chief Complaint   Patient presents with    Right Middle Finger - Follow-up         SUBJECTIVE:  Tammy Mcbride is a 50y o  year old female who presents for follow up of her right long finger middle phalangeal fracture  She notes improvement in range of motion but continues to see swelling in the finger and has localized tenderness to the middle phalanx  She states she continues to keep it tuyet-taped        PAST MEDICAL HISTORY:  Past Medical History:   Diagnosis Date    Disease of thyroid gland     GERD (gastroesophageal reflux disease)     High cholesterol        PAST SURGICAL HISTORY:  Past Surgical History:   Procedure Laterality Date    ANKLE SURGERY Left     FOOT SURGERY Left     HYSTERECTOMY      SHOULDER SURGERY Right        FAMILY HISTORY:  Family History   Problem Relation Age of Onset    Diabetes Mother     Diabetes Father     Heart attack Father        SOCIAL HISTORY:  Social History     Tobacco Use    Smoking status: Current Every Day Smoker     Packs/day: 0 50     Types: Cigarettes    Smokeless tobacco: Never Used   Vaping Use    Vaping Use: Never used   Substance Use Topics    Alcohol use: Not Currently    Drug use: Never       MEDICATIONS:    Current Outpatient Medications:     atorvastatin (LIPITOR) 40 mg tablet, Take 40 mg by mouth daily, Disp: , Rfl:     diclofenac sodium (VOLTAREN) 50 mg EC tablet,

## 2021-12-09 DIAGNOSIS — Z12.31 ENCOUNTER FOR SCREENING MAMMOGRAM FOR MALIGNANT NEOPLASM OF BREAST: ICD-10-CM

## 2022-06-20 ENCOUNTER — OFFICE VISIT (OUTPATIENT)
Dept: OBGYN CLINIC | Facility: CLINIC | Age: 57
End: 2022-06-20
Payer: COMMERCIAL

## 2022-06-20 ENCOUNTER — HOSPITAL ENCOUNTER (OUTPATIENT)
Dept: RADIOLOGY | Facility: CLINIC | Age: 57
Discharge: HOME | End: 2022-06-20
Payer: COMMERCIAL

## 2022-06-20 VITALS
HEIGHT: 66 IN | WEIGHT: 150 LBS | DIASTOLIC BLOOD PRESSURE: 78 MMHG | SYSTOLIC BLOOD PRESSURE: 124 MMHG | HEART RATE: 73 BPM | BODY MASS INDEX: 24.11 KG/M2 | TEMPERATURE: 98.5 F

## 2022-06-20 DIAGNOSIS — M25.512 LEFT SHOULDER PAIN, UNSPECIFIED CHRONICITY: ICD-10-CM

## 2022-06-20 DIAGNOSIS — M75.82 TENDINITIS OF LEFT ROTATOR CUFF: ICD-10-CM

## 2022-06-20 DIAGNOSIS — G89.29 CHRONIC LEFT SHOULDER PAIN: Primary | ICD-10-CM

## 2022-06-20 DIAGNOSIS — M25.512 CHRONIC LEFT SHOULDER PAIN: Primary | ICD-10-CM

## 2022-06-20 PROCEDURE — 99214 OFFICE O/P EST MOD 30 MIN: CPT | Performed by: ORTHOPAEDIC SURGERY

## 2022-06-20 PROCEDURE — 73030 X-RAY EXAM OF SHOULDER: CPT

## 2022-06-20 PROCEDURE — 20610 DRAIN/INJ JOINT/BURSA W/O US: CPT | Performed by: ORTHOPAEDIC SURGERY

## 2022-06-20 RX ORDER — TRIAMCINOLONE ACETONIDE 40 MG/ML
40 INJECTION, SUSPENSION INTRA-ARTICULAR; INTRAMUSCULAR
Status: COMPLETED | OUTPATIENT
Start: 2022-06-20 | End: 2022-06-20

## 2022-06-20 RX ORDER — LIDOCAINE HYDROCHLORIDE 10 MG/ML
4 INJECTION, SOLUTION INFILTRATION; PERINEURAL
Status: COMPLETED | OUTPATIENT
Start: 2022-06-20 | End: 2022-06-20

## 2022-06-20 RX ADMIN — LIDOCAINE HYDROCHLORIDE 4 ML: 10 INJECTION, SOLUTION INFILTRATION; PERINEURAL at 17:09

## 2022-06-20 RX ADMIN — TRIAMCINOLONE ACETONIDE 40 MG: 40 INJECTION, SUSPENSION INTRA-ARTICULAR; INTRAMUSCULAR at 17:09

## 2022-06-20 NOTE — PROGRESS NOTES
ASSESSMENT/PLAN:    Diagnoses and all orders for this visit:    Chronic left shoulder pain  -     XR shoulder 2+ vw left; Future  -     Ambulatory Referral to Physical Therapy; Future    Tendinitis of left rotator cuff  -     Ambulatory Referral to Physical Therapy; Future    Plan:  Treatment options were discussed  I recommended a course of physical therapy  I also discussed the potential benefit of injection and she elected to proceed with injection of the subacromial space which was performed without difficulty  I will see her in 2 weeks for re-evaluation  The office should be contacted if questions or concerns arise  I instructed her in the importance of attending physical therapy prior to her evaluation in 2 weeks  Large joint arthrocentesis: L subacromial bursa  Universal Protocol:  Consent: Verbal consent obtained  Consent given by: patient  Patient understanding: patient states understanding of the procedure being performed    Supporting Documentation  Indications: pain   Procedure Details  Location: shoulder - L subacromial bursa  Needle size: 22 G  Ultrasound guidance: no  Approach: posterolateral  Medications administered: 4 mL lidocaine 1 %; 40 mg triamcinolone acetonide 40 mg/mL            _____________________________________________________  CHIEF COMPLAINT:  Chief Complaint   Patient presents with    Left Shoulder - Pain         SUBJECTIVE:  Esme Layne is a 49y o  year old LHD female who presents for evaluation of left shoulder pain x4 weeks  She denies any specific incident of injury  She states that she has had increasing pain and soreness in the anterior aspect of the left shoulder  She reports a past medical history that includes subacromial decompression of the right shoulder, and states that the pain she is experiencing now in left upper extremity is similar to that which she felt before  She expresses difficulty with overhead motion  She denies any feelings of instability  She reports occasional feelings of numbness and tingling, but states that this is not a common occurrence  She localizes the pain to the anterior shoulder radiating superiorly and occasionally laterally over the deltoid  She notes difficulty with sleeping because of her shoulder pain  PAST MEDICAL HISTORY:  Past Medical History:   Diagnosis Date    Disease of thyroid gland     GERD (gastroesophageal reflux disease)     High cholesterol        PAST SURGICAL HISTORY:  Past Surgical History:   Procedure Laterality Date    ANKLE SURGERY Left     FOOT SURGERY Left     HYSTERECTOMY      SHOULDER SURGERY Right        FAMILY HISTORY:  Family History   Problem Relation Age of Onset    Diabetes Mother     Diabetes Father     Heart attack Father        SOCIAL HISTORY:  Social History     Tobacco Use    Smoking status: Current Every Day Smoker     Packs/day: 0 50     Types: Cigarettes    Smokeless tobacco: Never Used   Vaping Use    Vaping Use: Never used   Substance Use Topics    Alcohol use: Not Currently    Drug use: Never       MEDICATIONS:    Current Outpatient Medications:     atorvastatin (LIPITOR) 40 mg tablet, Take 40 mg by mouth daily, Disp: , Rfl:     diclofenac sodium (VOLTAREN) 50 mg EC tablet, , Disp: , Rfl:     famotidine (PEPCID) 20 mg tablet, Take 20 mg by mouth 2 (two) times a day, Disp: , Rfl:     gabapentin (NEURONTIN) 300 mg capsule, TAKE 1 CAPSULE BY MOUTH IN THE MORNING AND AFTERNOON AND 2 CAPSULES AT BEDTIME, Disp: , Rfl:     levothyroxine 150 mcg tablet, TAKE 1 TABLET BY MOUTH EVERY DAY AT LEAST 30 MINUTES PRIOR TO BREAKFAST OR OTHER MEDS, Disp: , Rfl:     ALLERGIES:  No Known Allergies    Review of systems:   Constitutional: Negative for fatigue, fever or loss of apetite  HENT: Negative  Respiratory: Negative for shortness of breath, dyspnea  Cardiovascular: Negative for chest pain/tightness  Gastrointestinal: Negative for abdominal pain, N/V     Endocrine: Negative for cold/heat intolerance, unexplained weight loss/gain  Genitourinary: Negative for flank pain, dysuria, hematuria  Musculoskeletal:  As noted in HPI   Skin: Negative for rash  Neurological:  Negative for numbness, tingling, or paresthesias   Psychiatric/Behavioral: Negative for agitation  _____________________________________________________  PHYSICAL EXAMINATION:    Blood pressure 124/78, pulse 73, temperature 98 5 °F (36 9 °C), temperature source Temporal, height 5' 6" (1 676 m), weight 68 kg (150 lb)  General: well developed and well nourished, alert, oriented times 3 and appears comfortable  Psychiatric: Normal  HEENT: Benign  Cardiovascular:  Normal rate    Pulmonary: No wheezing or stridor  Abdomen: Soft, Nontender  Skin: No masses, erythema, lacerations, fluctation, ulcerations  Neurovascular: Motor and sensory exams are intact and pulses palpable      MUSCULOSKELETAL EXAMINATION:  Left shoulder -   No anatomical deformity  Skin is warm and dry to touch with no signs of erythema, ecchymosis, or infection  No soft tissue swelling or effusion noted  Positive palpable crepitus with passive motion  TTP over anterior acromion, TTP over long head biceps tendon and bicipital groove, nontender over posterior capsule, nontender over AC joint  ROM  FF 0°-160°, ABD 0°-160° with pain above 130°, ER 0°-75°  MMT 4+/5 abduction, 4+/5 forward flexion, 5/5 external rotation  - glenohumeral instability appreciated on exam  + Neer's, + Pereira  + Speed's, - Yergason's  - empty can, - drop-arm, - belly press, painful resisted external rotation, - lift-off  Demonstrates normal elbow, wrist, and finger motion  2+ distal radial pulse with brisk capillary refill to the fingers  Radial, median, and ulnar motor and sensory distributions intact  Sensation light touch intact distally        _____________________________________________________  STUDIES REVIEWED:  Attending Physician has personally reviewed pertinent imaging in PACS, impression is as follows:    Review of radiographic series taken 6/20/2022 of the left shoulder shows no evidence of significant degenerative disease or acute processes  There is no evidence of significant curve or hooking of the acromion          Scribe Attestation    I,:  Alix Quiles am acting as a scribe while in the presence of the attending physician :       I,:  Mable Melchor personally performed the services described in this documentation    as scribed in my presence :

## 2022-06-21 NOTE — PROGRESS NOTES
PT Evaluation     Today's date: 2022  Patient name: Jose Gonzales  : 1965  MRN: [id number]  Referring provider: Graciela Tamayo DO  Dx:   Encounter Diagnosis     ICD-10-CM    1  Chronic left shoulder pain  M25 512 Ambulatory Referral to Physical Therapy    G89 29    2  Tendinitis of left rotator cuff  M75 82 Ambulatory Referral to Physical Therapy                  Assessment  Assessment details: PT notes the patient with decrease ROM and strength t/o the left shoulder and scapula with demonstration of impaired UB posture leading to increase pain levels and functional limitations with need for 6 weeks with focus on scapular stabilization, posture, manual therapy, analgesic modalities, and HEP  Goals  ST  Initiate HEP  2  Decrease pain levels by 25-50%   3  Increase strength by 1-2 mm grades  4  Increase ROM by 25-50%   LT  Improve postural awareness  2  Improve scapular stability   3  Decrease limitations with reaching, lifting and carrying  4  Decrease limitations with OH and push/pull activities  5  Decrease limitations with ADL  6   DC with HEP    Plan  Plan details: PT notes review of POC and findings with patient who is in agreement with PT recommendations of course of skilled therapy      Patient would benefit from: PT eval and skilled physical therapy  Frequency: 2x week  Duration in weeks: 6  Treatment plan discussed with: patient        Subjective    Objective           Precautions: ***      Manuals                                        Neuro Re-Ed                                                                Ther Ex                                                                HEP update/review         Ther Activity                        Gait Training                        Modalities

## 2022-06-22 ENCOUNTER — EVALUATION (OUTPATIENT)
Dept: PHYSICAL THERAPY | Facility: CLINIC | Age: 57
End: 2022-06-22
Payer: COMMERCIAL

## 2022-06-22 DIAGNOSIS — M75.82 TENDINITIS OF LEFT ROTATOR CUFF: ICD-10-CM

## 2022-06-22 DIAGNOSIS — G89.29 CHRONIC LEFT SHOULDER PAIN: ICD-10-CM

## 2022-06-22 DIAGNOSIS — M25.512 CHRONIC LEFT SHOULDER PAIN: ICD-10-CM

## 2022-06-22 PROCEDURE — 97535 SELF CARE MNGMENT TRAINING: CPT

## 2022-06-22 PROCEDURE — 97161 PT EVAL LOW COMPLEX 20 MIN: CPT

## 2022-06-22 NOTE — PROGRESS NOTES
PT Evaluation     Today's date: 2022  Patient name: Alondra Mendoza  : 1965  MRN: [id number]  Referring provider: Arminda Jiménez DO  Dx:   Encounter Diagnosis     ICD-10-CM    1  Chronic left shoulder pain  M25 512 Ambulatory Referral to Physical Therapy    G89 29    2  Tendinitis of left rotator cuff  M75 82 Ambulatory Referral to Physical Therapy                  Assessment  Assessment details: Pt is a 49year old female who presents to OP PT for L chronic shoulder pain and tendinitis of RTC  She notes pain x4 weeks without GERRY  Upon examination, patient presents with tenderness at coracoid and bicipital groove, limited ROM with pain towards end range, and decreased strength with pain  Due to her current impairments patient has difficulty with reaching, lifting, ADLs and performing craft hobbies  Pt would benefit from OP PT services in order to address current impairments and functional limitations  Thank you for your referral!    Impairments: abnormal or restricted ROM, activity intolerance, impaired physical strength, lacks appropriate home exercise program and pain with function    Goals  STG (to be met within 4 weeks):  1  Pt will improve pain in L shoulder at worst to no greater than 6/10 in order to improve shoulder functional tolerance  2  Pt will improve L shoulder ROM by at least 5* in order to improve reaching  3  Pt will improve L shoulder strength by at least 1/2 grade in order to progress to PLOF  4  Pt will be independent with HEP  5  Pt will improve FOTO score by at least 10 points in order to progress to PLOF and improve QOL    LTG (to be met within 8 weeks):  1  Pt will be able to perform all activities pain free in order to maximize independence  2  Pt will restore WFL L shoulder ROM in order to participate in recreational activities  3  Pt will restore WFL L shoulder strength in order to return to PLOF  4  Pt to meet FOTO discharge score in order to improve QOL  5   Pt to be independent with maintenance HEP in order to maintain progress and prevent acute recurrence of symptoms    Plan  Patient would benefit from: skilled physical therapy  Planned modality interventions: unattended electrical stimulation, thermotherapy: hydrocollator packs and cryotherapy  Planned therapy interventions: joint mobilization, manual therapy, neuromuscular re-education, patient education, therapeutic exercise, stretching, strengthening and home exercise program  Frequency: 2x week  Duration in weeks: 4  Treatment plan discussed with: patient        Subjective Evaluation    History of Present Illness  Mechanism of injury: Pt notes the start of L shoulder pain x4 weeks ago  She denies a GERRY but figured it would improve with time  Pt was seen by ortho and was given injection with x2 days relief and referral for OP PT  She notes her symptoms are aggravated with overhead activity, lifting, ADLs, and reaching behind her back  Her hobbies include crafting to which she has difficulty doing so due to pain  Pain  Current pain ratin  At best pain ratin  Location: L anterior shoulder   Quality: sharp  Aggravating factors: lifting and overhead activity    Hand dominance: left    Treatments  Current treatment: medication and physical therapy  Patient Goals  Patient goals for therapy: increased strength, independence with ADLs/IADLs, increased motion, decreased pain and return to sport/leisure activities          Objective     Tenderness     Left Shoulder   Tenderness in the bicipital groove and coracoid process  No tenderness in the [facility] [place] joint, infraspinatus tendon and supraspinatus tendon       Neurological Testing     Sensation     Shoulder   Left Shoulder   Intact: light touch    Right Shoulder   Intact: light touch    Reflexes   Left   Deltoid (C5): normal (2+)    Right   Deltoid (C5): normal (2+)    Active Range of Motion   Left Shoulder   Flexion: 117 degrees with pain  Abduction: 126 degrees with pain  External rotation BTH: C2 with pain  Internal rotation BTB: sacrum with pain    Right Shoulder   External rotation BTH: C6   Internal rotation BTB: L2     Passive Range of Motion   Left Shoulder   Normal passive range of motion    Right Shoulder   Normal passive range of motion    Strength/Myotome Testing     Left Shoulder     Planes of Motion   Flexion: 4-   Abduction: 3+     Right Shoulder     Planes of Motion   Flexion: 4-   Abduction: 4-     Tests     Left Shoulder   Positive empty can  Negative full can and Hawkin's                Precautions: H/o R shoulder surgery    Manuals 6/22       Shoulder PROM        [facility] Joint Mobs                        Neuro Re-Ed        Prone Y T I        LT wall lift offs        Prone Row        Scapular Clocks        SA at wall with res&FR        Wall ball CW/CCW        Prone 90/90                                TherEx        UBE        Sh iso's (flex,ext,abd, IR, ER)        S/L (flexion, horiz abd, ER)        MTP/ LTP        Tband b/l ER        Scaption        Instructed HEP & education 10'               Modalities

## 2022-06-29 ENCOUNTER — APPOINTMENT (OUTPATIENT)
Dept: PHYSICAL THERAPY | Facility: CLINIC | Age: 57
End: 2022-06-29
Payer: COMMERCIAL

## 2022-06-29 NOTE — PROGRESS NOTES
Daily Note     Today's date: 2022  Patient name: José Juan  : 1965  MRN: [id number]  Referring provider: Rambo Avila DO  Dx:   Encounter Diagnosis     ICD-10-CM    1  Chronic left shoulder pain  M25 512     G89 29    2  Tendinitis of left rotator cuff  M75 82                   Subjective: ***      Objective: See treatment diary below      Assessment: Tolerated treatment well  Patient exhibited good technique with therapeutic exercises and would benefit from continued PT to increase L shoulder ROM/strength and endurance to improve mobility  Plan: Continue per plan of care        Precautions: H/o R shoulder surgery    Manuals       Shoulder PROM        [address] Joint Mobs                        Neuro Re-Ed        Prone Y T I        LT wall lift offs        Prone Row        Scapular Clocks        SA at wall with res&FR        Wall ball CW/CCW        Prone 90/90                                TherEx        UBE        Sh iso's (flex,ext,abd, IR, ER)        S/L (flexion, horiz abd, ER)        MTP/ LTP        Tband b/l ER        Scaption        Instructed HEP & education 10'               Modalities

## 2022-06-30 ENCOUNTER — APPOINTMENT (OUTPATIENT)
Dept: PHYSICAL THERAPY | Facility: CLINIC | Age: 57
End: 2022-06-30
Payer: COMMERCIAL

## 2022-07-17 NOTE — PROGRESS NOTES
PT notes the patient with no further contact with clinic to update status or re-schedule appointments so PT with decision to HEP with secondary to expiration of PT prescription

## 2023-02-02 ENCOUNTER — HOSPITAL ENCOUNTER (EMERGENCY)
Facility: HOSPITAL | Age: 58
Discharge: HOME | End: 2023-02-02
Attending: EMERGENCY MEDICINE

## 2023-02-02 VITALS
SYSTOLIC BLOOD PRESSURE: 115 MMHG | OXYGEN SATURATION: 96 % | DIASTOLIC BLOOD PRESSURE: 67 MMHG | HEART RATE: 107 BPM | RESPIRATION RATE: 16 BRPM | TEMPERATURE: 100 F

## 2023-02-02 DIAGNOSIS — R68.89 FLU-LIKE SYMPTOMS: Primary | ICD-10-CM

## 2023-02-02 DIAGNOSIS — Z20.822 EXPOSURE TO COVID-19 VIRUS: ICD-10-CM

## 2023-02-02 DIAGNOSIS — U07.1 COVID-19: ICD-10-CM

## 2023-02-02 DIAGNOSIS — B34.9 VIRAL ILLNESS: ICD-10-CM

## 2023-02-02 LAB
FLUAV RNA RESP QL NAA+PROBE: NEGATIVE
FLUBV RNA RESP QL NAA+PROBE: NEGATIVE
RSV RNA RESP QL NAA+PROBE: NEGATIVE
SARS-COV-2 RNA RESP QL NAA+PROBE: POSITIVE

## 2023-02-02 RX ORDER — NIRMATRELVIR AND RITONAVIR 300-100 MG
3 KIT ORAL 2 TIMES DAILY
Qty: 30 TABLET | Refills: 0 | Status: SHIPPED | OUTPATIENT
Start: 2023-02-02 | End: 2023-02-07

## 2023-02-02 NOTE — ED PROVIDER NOTES
History  Chief Complaint   Patient presents with   • Flu Symptoms     Pt reports nasal congestion, post nasal drip, and headache since this AM, also reports fever of 103 at home      Patient is a 53-year-old female presenting to the emergency department complaining of cough, congestion, stuffy and runny nose with postnasal drip and fevers, symptoms started today, she was notified via close contact who tested positive for COVID-19 yesterday, she is a smoker and has a history of diabetes          Prior to Admission Medications   Prescriptions Last Dose Informant Patient Reported? Taking?   atorvastatin (LIPITOR) 40 mg tablet   Yes No   Sig: Take 40 mg by mouth daily   diclofenac sodium (VOLTAREN) 50 mg EC tablet   Yes No   famotidine (PEPCID) 20 mg tablet   Yes No   Sig: Take 20 mg by mouth 2 (two) times a day   gabapentin (NEURONTIN) 300 mg capsule   Yes No   Sig: TAKE 1 CAPSULE BY MOUTH IN THE MORNING AND AFTERNOON AND 2 CAPSULES AT BEDTIME   levothyroxine 150 mcg tablet   Yes No   Sig: TAKE 1 TABLET BY MOUTH EVERY DAY AT LEAST 30 MINUTES PRIOR TO BREAKFAST OR OTHER MEDS      Facility-Administered Medications: None       Past Medical History:   Diagnosis Date   • Disease of thyroid gland    • GERD (gastroesophageal reflux disease)    • High cholesterol        Past Surgical History:   Procedure Laterality Date   • ANKLE SURGERY Left    • FOOT SURGERY Left    • HYSTERECTOMY     • SHOULDER SURGERY Right        Family History   Problem Relation Age of Onset   • Diabetes Mother    • Diabetes Father    • Heart attack Father      I have reviewed and agree with the history as documented      E-Cigarette/Vaping   • E-Cigarette Use Never User      E-Cigarette/Vaping Substances     Social History     Tobacco Use   • Smoking status: Every Day     Packs/day: 0 50     Types: Cigarettes   • Smokeless tobacco: Never   Vaping Use   • Vaping Use: Never used   Substance Use Topics   • Alcohol use: Not Currently   • Drug use: Never Review of Systems   Constitutional: Positive for chills, fatigue and fever  HENT: Positive for congestion, postnasal drip and rhinorrhea  Eyes: Negative  Respiratory: Positive for cough  Cardiovascular: Negative  Gastrointestinal: Negative  Endocrine: Negative  Genitourinary: Negative  Musculoskeletal: Positive for myalgias  Skin: Negative  Allergic/Immunologic: Negative  Neurological: Negative  Hematological: Negative  Psychiatric/Behavioral: Negative  Physical Exam  Physical Exam  Constitutional:       Appearance: She is well-developed  She is ill-appearing  HENT:      Head: Normocephalic and atraumatic  Eyes:      Conjunctiva/sclera: Conjunctivae normal       Pupils: Pupils are equal, round, and reactive to light  Cardiovascular:      Rate and Rhythm: Normal rate and regular rhythm  Heart sounds: Normal heart sounds  Pulmonary:      Effort: Pulmonary effort is normal       Breath sounds: Normal breath sounds  Abdominal:      Palpations: Abdomen is soft  Musculoskeletal:         General: Normal range of motion  Cervical back: Normal range of motion and neck supple  Skin:     General: Skin is warm and dry  Neurological:      Mental Status: She is alert and oriented to person, place, and time           Vital Signs  ED Triage Vitals [02/02/23 1405]   Temperature Pulse Respirations Blood Pressure SpO2   100 °F (37 8 °C) (!) 110 20 148/77 97 %      Temp Source Heart Rate Source Patient Position - Orthostatic VS BP Location FiO2 (%)   Temporal Monitor Sitting Left arm --      Pain Score       --           Vitals:    02/02/23 1405 02/02/23 1415   BP: 148/77 115/67   Pulse: (!) 110 (!) 107   Patient Position - Orthostatic VS: Sitting          Visual Acuity      ED Medications  Medications - No data to display    Diagnostic Studies  Results Reviewed     Procedure Component Value Units Date/Time    FLU/RSV/COVID - if FLU/RSV clinically relevant [[id number]]  (Abnormal) Collected: 02/02/23 1417    Lab Status: Final result Specimen: Nares from Nose Updated: 02/02/23 1503     SARS-CoV-2 Positive     INFLUENZA A PCR Negative     INFLUENZA B PCR Negative     RSV PCR Negative    Narrative:      FOR PEDIATRIC PATIENTS - copy/paste COVID Guidelines URL to browser: [email or web address]    SARS-CoV-2 assay is a Nucleic Acid Amplification assay intended for the  qualitative detection of nucleic acid from SARS-CoV-2 in nasopharyngeal  swabs  Results are for the presumptive identification of SARS-CoV-2 RNA  Positive results are indicative of infection with SARS-CoV-2, the virus  causing COVID-19, but do not rule out bacterial infection or co-infection  with other viruses  Laboratories within the [place] and its  territories are required to report all positive results to the appropriate  public health authorities  Negative results do not preclude SARS-CoV-2  infection and should not be used as the sole basis for treatment or other  patient management decisions  Negative results must be combined with  clinical observations, patient history, and epidemiological information  This test has not been FDA cleared or approved  This test has been authorized by FDA under an Emergency Use Authorization  (EUA)  This test is only authorized for the duration of time the  declaration that circumstances exist justifying the authorization of the  emergency use of an in vitro diagnostic tests for detection of SARS-CoV-2  virus and/or diagnosis of COVID-19 infection under section 564(b)(1) of  the Act, 21 U  S C  756KRG-4(Z)(5), unless the authorization is terminated  or revoked sooner  The test has been validated but independent review by FDA  and CLIA is pending  Test performed using [employer]: This RT-PCR assay targets N2,  a region unique to SARS-CoV-2   A conserved region in the E-gene was chosen  for pan-Sarbecovirus detection which includes SARS-CoV-2  According to CMS-2020-01-R, this platform meets the definition of high-throughput technology  No orders to display              Procedures  Procedures         ED Course                                             Medical Decision Making  Patient is a 46-year-old female presenting to the emergency department complaining of viral symptoms with cough and congestion, runny nose, postnasal drip and fevers, symptoms started today but she had contact with somebody who tested positive for COVID yesterday, she is a smoker and has a history of diabetes, symptoms consistent with viral upper respiratory illness, will send nasal swab for testing for COVID, influenza or RSV, lung sounds are clear to auscultation so no concern for pneumonia, vital signs are stable and patient appears in no acute distress, therefore discharged with instructions for follow-up, initially advised we will call with results of nasal swab, when COVID test came back positive I did place a call and speak to patient to inform her of this and sent a prescription to the pharmacy for paxlovid, advised patient to continue good supportive care at home, discontinue smoking, follow-up with PCP as needed or return if symptoms worsen, patient acknowledges understanding and agreement with this plan    COVID-19: acute illness or injury  Exposure to COVID-19 virus: acute illness or injury  Flu-like symptoms: acute illness or injury  Viral illness: acute illness or injury  Risk  OTC drugs  Prescription drug management            Disposition  Final diagnoses:   Flu-like symptoms   Viral illness   Exposure to COVID-19 virus   COVID-19     Time reflects when diagnosis was documented in both MDM as applicable and the Disposition within this note     Time User Action Codes Description Comment    2/2/2023  2:25 PM Alesha Malik Add [M84 85] Flu-like symptoms     2/2/2023  2:25 PM Benson, [facility] [B34 9] Viral illness     2/2/2023  2:25 PM Kristian Knowlesjc Jimenez Add [Z20 822] Exposure to COVID-19 virus     2/2/2023  3:36 PM Benson [facility] [U07 1] COVID-19       ED Disposition     ED Disposition   Discharge    Condition   Stable    Date/Time   Thu Feb 2, 2023  2:25 PM    [address] discharge to home/self care  Follow-up Information     Follow up With Specialties Details Why Contact Info    Lenny Magallanes MD Internal Medicine In 3 days  [address]  [phone]            Discharge Medication List as of 2/2/2023  2:26 PM      CONTINUE these medications which have NOT CHANGED    Details   atorvastatin (LIPITOR) 40 mg tablet Take 40 mg by mouth daily, Starting Mon 5/25/2020, Historical Med      diclofenac sodium (VOLTAREN) 50 mg EC tablet Starting Sun 7/4/2021, Historical Med      famotidine (PEPCID) 20 mg tablet Take 20 mg by mouth 2 (two) times a day, Historical Med      gabapentin (NEURONTIN) 300 mg capsule TAKE 1 CAPSULE BY MOUTH IN THE MORNING AND AFTERNOON AND 2 CAPSULES AT BEDTIME, Historical Med      levothyroxine 150 mcg tablet TAKE 1 TABLET BY MOUTH EVERY DAY AT LEAST 30 MINUTES PRIOR TO BREAKFAST OR OTHER MEDS, Historical Med             No discharge procedures on file      PDMP Review     None          ED Provider  Electronically Signed by           Carlin West DO  02/02/23 7411

## 2023-04-07 ENCOUNTER — HOSPITAL ENCOUNTER (OUTPATIENT)
Dept: RADIOLOGY | Facility: HOSPITAL | Age: 58
End: 2023-04-07

## 2023-04-07 DIAGNOSIS — L81.9 DISCOLORATION OF SKIN OF TOE: ICD-10-CM

## 2023-04-21 ENCOUNTER — HOSPITAL ENCOUNTER (OUTPATIENT)
Dept: RADIOLOGY | Facility: CLINIC | Age: 58
Discharge: HOME | End: 2023-04-21

## 2023-04-21 DIAGNOSIS — M79.671 BILATERAL FOOT PAIN: ICD-10-CM

## 2023-04-21 DIAGNOSIS — M79.672 BILATERAL FOOT PAIN: ICD-10-CM

## 2023-04-21 PROBLEM — E11.42 TYPE 2 DIABETES MELLITUS WITH DIABETIC POLYNEUROPATHY, WITHOUT LONG-TERM CURRENT USE OF INSULIN (HCC): Status: ACTIVE | Noted: 2023-04-21

## 2023-06-21 ENCOUNTER — HOSPITAL ENCOUNTER (OUTPATIENT)
Dept: NON INVASIVE DIAGNOSTICS | Facility: HOSPITAL | Age: 58
Discharge: HOME | End: 2023-06-21
Payer: COMMERCIAL

## 2023-06-21 DIAGNOSIS — L81.9 DISORDER OF PIGMENTATION, UNSPECIFIED: ICD-10-CM

## 2023-06-21 PROCEDURE — 93923 UPR/LXTR ART STDY 3+ LVLS: CPT | Performed by: SURGERY

## 2023-06-21 PROCEDURE — 93925 LOWER EXTREMITY STUDY: CPT

## 2023-06-21 PROCEDURE — 93925 LOWER EXTREMITY STUDY: CPT | Performed by: SURGERY

## 2023-06-21 PROCEDURE — 93923 UPR/LXTR ART STDY 3+ LVLS: CPT

## 2023-07-28 ENCOUNTER — APPOINTMENT (EMERGENCY)
Dept: RADIOLOGY | Facility: HOSPITAL | Age: 58
End: 2023-07-28
Payer: COMMERCIAL

## 2023-07-28 ENCOUNTER — HOSPITAL ENCOUNTER (EMERGENCY)
Facility: HOSPITAL | Age: 58
Discharge: HOME | End: 2023-07-28
Attending: EMERGENCY MEDICINE | Admitting: EMERGENCY MEDICINE
Payer: COMMERCIAL

## 2023-07-28 VITALS
DIASTOLIC BLOOD PRESSURE: 65 MMHG | RESPIRATION RATE: 18 BRPM | SYSTOLIC BLOOD PRESSURE: 109 MMHG | HEART RATE: 90 BPM | OXYGEN SATURATION: 97 % | WEIGHT: 164.02 LBS | BODY MASS INDEX: 26.36 KG/M2 | TEMPERATURE: 97.7 F | HEIGHT: 66 IN

## 2023-07-28 DIAGNOSIS — S60.419A ABRASION OF FINGER, INITIAL ENCOUNTER: ICD-10-CM

## 2023-07-28 DIAGNOSIS — S67.10XA CRUSHING INJURY OF FINGER, INITIAL ENCOUNTER: Primary | ICD-10-CM

## 2023-07-28 PROCEDURE — 90471 IMMUNIZATION ADMIN: CPT

## 2023-07-28 PROCEDURE — 90715 TDAP VACCINE 7 YRS/> IM: CPT | Performed by: PHYSICIAN ASSISTANT

## 2023-07-28 PROCEDURE — 99284 EMERGENCY DEPT VISIT MOD MDM: CPT | Performed by: PHYSICIAN ASSISTANT

## 2023-07-28 PROCEDURE — 99283 EMERGENCY DEPT VISIT LOW MDM: CPT

## 2023-07-28 PROCEDURE — 96372 THER/PROPH/DIAG INJ SC/IM: CPT

## 2023-07-28 PROCEDURE — 73140 X-RAY EXAM OF FINGER(S): CPT

## 2023-07-28 RX ORDER — KETOROLAC TROMETHAMINE 30 MG/ML
15 INJECTION, SOLUTION INTRAMUSCULAR; INTRAVENOUS ONCE
Status: COMPLETED | OUTPATIENT
Start: 2023-07-28 | End: 2023-07-28

## 2023-07-28 RX ORDER — CEPHALEXIN 500 MG/1
500 CAPSULE ORAL EVERY 6 HOURS SCHEDULED
Qty: 28 CAPSULE | Refills: 0 | Status: SHIPPED | OUTPATIENT
Start: 2023-07-28 | End: 2023-08-04

## 2023-07-28 RX ADMIN — TETANUS TOXOID, REDUCED DIPHTHERIA TOXOID AND ACELLULAR PERTUSSIS VACCINE, ADSORBED 0.5 ML: 5; 2.5; 8; 8; 2.5 SUSPENSION INTRAMUSCULAR at 11:10

## 2023-07-28 RX ADMIN — KETOROLAC TROMETHAMINE 15 MG: 30 INJECTION, SOLUTION INTRAMUSCULAR; INTRAVENOUS at 11:09

## 2023-07-28 NOTE — ED PROVIDER NOTES
History  Chief Complaint   Patient presents with   • Finger Laceration     Pt injured right third finger on a can  this AM. States digit is swollen and unable to move it. The patient is 30-year-old female past medical history of diabetes who presents emerged part today with a chief complaint of a crush injury to the right middle finger prior to arrival.  Patient states that she was using a can crush her this morning and accidentally crushed her right middle finger within the object. States that she has a small abrasion to the right middle finger. Pain over the right PIP joint. Slight swelling. Had previous injury to that digit previously. Unknown last tetanus vaccination. Finger Laceration  Location:  Finger  Finger laceration location:  R middle finger  Laceration depth: abrasion. Laceration mechanism:  Blunt object  Pain details:     Quality:  Aching    Severity:  Moderate  Foreign body present:  No foreign bodies  Relieved by:  Nothing  Worsened by: Movement  Ineffective treatments:  Certain positions  Tetanus status:  Unknown      Prior to Admission Medications   Prescriptions Last Dose Informant Patient Reported?  Taking?   atorvastatin (LIPITOR) 40 mg tablet   Yes No   Sig: Take 40 mg by mouth daily   cephalexin (KEFLEX) 500 mg capsule   Yes No   Sig: TAKE 1 CAPSULE BY MOUTH IN THE MORNING AND 1 CAPSULE AT NOON AND 1 CAPSULE BEFORE BEDTIME FOR 7 DAYS   diclofenac sodium (VOLTAREN) 50 mg EC tablet   Yes No   famotidine (PEPCID) 20 mg tablet   Yes No   Sig: Take 20 mg by mouth 2 (two) times a day   fluticasone (FLONASE) 50 mcg/act nasal spray   Yes No   Sig: SPRAY 2 SPRAYS INTO EACH NOSTRIL IN THE MORNING   gabapentin (NEURONTIN) 300 mg capsule   Yes No   Sig: TAKE 1 CAPSULE BY MOUTH IN THE MORNING AND AFTERNOON AND 2 CAPSULES AT BEDTIME   levothyroxine 150 mcg tablet   Yes No   Sig: TAKE 1 TABLET BY MOUTH EVERY DAY AT LEAST 30 MINUTES PRIOR TO BREAKFAST OR OTHER MEDS   methylPREDNISolone 4 MG tablet therapy pack   Yes No   Sig: TAKE 6 TABLETS ON DAY 1 AS DIRECTED ON PACKAGE AND DECREASE BY 1 TAB EACH DAY FOR A TOTAL OF 6 DAYS   varenicline (CHANTIX) 1 mg tablet   Yes No   Sig: TAKE BY MOUTH 1 TABLET IN THE MORNING AND 1 TABLET BEFORE BEDTIME. AS DIRECTED ON BOX. .      Facility-Administered Medications: None       Past Medical History:   Diagnosis Date   • Disease of thyroid gland    • GERD (gastroesophageal reflux disease)    • High cholesterol        Past Surgical History:   Procedure Laterality Date   • ANKLE SURGERY Left    • FOOT SURGERY Left    • HYSTERECTOMY     • SHOULDER SURGERY Right        Family History   Problem Relation Age of Onset   • Diabetes Mother    • Diabetes Father    • Heart attack Father      I have reviewed and agree with the history as documented. E-Cigarette/Vaping   • E-Cigarette Use Never User      E-Cigarette/Vaping Substances     Social History     Tobacco Use   • Smoking status: Every Day     Packs/day: 0.50     Types: Cigarettes   • Smokeless tobacco: Never   Vaping Use   • Vaping Use: Never used   Substance Use Topics   • Alcohol use: Not Currently   • Drug use: Never       Review of Systems   All other systems reviewed and are negative. Physical Exam  Physical Exam  Vitals and nursing note reviewed. Constitutional:       General: She is not in acute distress. Appearance: She is well-developed. HENT:      Head: Normocephalic and atraumatic. Eyes:      Conjunctiva/sclera: Conjunctivae normal.   Cardiovascular:      Rate and Rhythm: Normal rate and regular rhythm. Heart sounds: No murmur heard. Pulmonary:      Effort: Pulmonary effort is normal. No respiratory distress. Breath sounds: Normal breath sounds. Abdominal:      Palpations: Abdomen is soft. Tenderness: There is no abdominal tenderness. Musculoskeletal:         General: No swelling. Hands:       Cervical back: Neck supple. Skin:     General: Skin is warm and dry. Capillary Refill: Capillary refill takes less than 2 seconds. Neurological:      General: No focal deficit present. Mental Status: She is alert and oriented to person, place, and time. Psychiatric:         Mood and Affect: Mood normal.         Vital Signs  ED Triage Vitals   Temperature Pulse Respirations Blood Pressure SpO2   07/28/23 1030 07/28/23 1019 07/28/23 1019 07/28/23 1019 07/28/23 1019   97.7 °F (36.5 °C) 88 18 133/73 97 %      Temp Source Heart Rate Source Patient Position - Orthostatic VS BP Location FiO2 (%)   07/28/23 1030 07/28/23 1019 07/28/23 1019 07/28/23 1019 --   Temporal Monitor Sitting Right arm       Pain Score       --                  Vitals:    07/28/23 1019 07/28/23 1030   BP: 133/73 109/65   Pulse: 88 90   Patient Position - Orthostatic VS: Sitting          Visual Acuity      ED Medications  Medications   tetanus-diphtheria-acellular pertussis (BOOSTRIX) IM injection 0.5 mL (0.5 mL Intramuscular Given 7/28/23 1110)   ketorolac (TORADOL) injection 15 mg (15 mg Intramuscular Given 7/28/23 1109)       Diagnostic Studies  Results Reviewed     None                 XR finger third digit-middle RIGHT   ED Interpretation by Lorenzo Guerrero PA-C (07/28 1128)   No acute fractures                   Procedures  Procedures         ED Course                               SBIRT 22yo+    Flowsheet Row Most Recent Value   Initial Alcohol Screen: US AUDIT-C     1. How often do you have a drink containing alcohol? 0 Filed at: 07/28/2023 1021   2. How many drinks containing alcohol do you have on a typical day you are drinking? 0 Filed at: 07/28/2023 1021   3b. FEMALE Any Age, or MALE 65+: How often do you have 4 or more drinks on one occassion? 0 Filed at: 07/28/2023 1021   Audit-C Score 0 Filed at: 07/28/2023 1021   ZACKARY: How many times in the past year have you. .. Used an illegal drug or used a prescription medication for non-medical reasons?  Never Filed at: 07/28/2023 1021 Medical Decision Making  The patient is 51-year-old female past medical history of diabetes who presents emerged part today with a chief complaint of a crush injury to the right middle finger prior to arrival.  Patient states that she was using a can crush her this morning and accidentally crushed her right middle finger within the object. States that she has a small abrasion to the right middle finger. Pain over the right PIP joint. Slight swelling. Had previous injury to that digit previously. Unknown last tetanus vaccination. Patient on physical examination had swelling and tenderness over the right third digit PIP. Small abrasion. Updated tetanus but no need at this time for wound repair with stitches. No fracture on x-ray. Patient does have a known deformity to that finger from a previous crush injury. Seems to have tendon involvement from the way the patient has a curvature of the finger. Referred to orthopedics for further evaluation of this old injury. Amount and/or Complexity of Data Reviewed  Radiology: ordered and independent interpretation performed. Decision-making details documented in ED Course. Risk  Prescription drug management. Disposition  Final diagnoses:   Crushing injury of finger, initial encounter   Abrasion of finger, initial encounter     Time reflects when diagnosis was documented in both MDM as applicable and the Disposition within this note     Time User Action Codes Description Comment    7/28/2023 11:33 AM Derian Mazariegos Add [S67.10XA] Crushing injury of finger, initial encounter     7/28/2023 11:33 AM Derian Mazariegos Add [S60.419A] Abrasion of finger, initial encounter       ED Disposition     ED Disposition   Discharge    Condition   Stable    Date/Time   Fri Jul 28, 2023 11:33 AM    Comment   Nancy Cm discharge to home/self care.                Follow-up Information     Follow up With Specialties Details Why Contact Juliette Rojas Sanjay Jeronimo MD Emergency Medicine Call   [address] [address]  [phone]            Discharge Medication List as of 7/28/2023 11:34 AM      START taking these medications    Details   !! cephalexin (KEFLEX) 500 mg capsule Take 1 capsule (500 mg total) by mouth every 6 (six) hours for 7 days, Starting Fri 7/28/2023, Until Fri 8/4/2023, Normal       !! - Potential duplicate medications found. Please discuss with provider. CONTINUE these medications which have NOT CHANGED    Details   atorvastatin (LIPITOR) 40 mg tablet Take 40 mg by mouth daily, Starting Mon 5/25/2020, Historical Med      !! cephalexin (KEFLEX) 500 mg capsule TAKE 1 CAPSULE BY MOUTH IN THE MORNING AND 1 CAPSULE AT NOON AND 1 CAPSULE BEFORE BEDTIME FOR 7 DAYS, Historical Med      diclofenac sodium (VOLTAREN) 50 mg EC tablet Starting Sun 7/4/2021, Historical Med      famotidine (PEPCID) 20 mg tablet Take 20 mg by mouth 2 (two) times a day, Historical Med      fluticasone (FLONASE) 50 mcg/act nasal spray SPRAY 2 SPRAYS INTO EACH NOSTRIL IN THE MORNING, Historical Med      gabapentin (NEURONTIN) 300 mg capsule TAKE 1 CAPSULE BY MOUTH IN THE MORNING AND AFTERNOON AND 2 CAPSULES AT BEDTIME, Historical Med      levothyroxine 150 mcg tablet TAKE 1 TABLET BY MOUTH EVERY DAY AT LEAST 30 MINUTES PRIOR TO BREAKFAST OR OTHER MEDS, Historical Med      methylPREDNISolone 4 MG tablet therapy pack TAKE 6 TABLETS ON DAY 1 AS DIRECTED ON PACKAGE AND DECREASE BY 1 TAB EACH DAY FOR A TOTAL OF 6 DAYS, Historical Med      varenicline (CHANTIX) 1 mg tablet TAKE BY MOUTH 1 TABLET IN THE MORNING AND 1 TABLET BEFORE BEDTIME. AS DIRECTED ON BOX. ., Historical Med       !! - Potential duplicate medications found. Please discuss with provider.               PDMP Review     None          ED Provider  Electronically Signed by           Low Santana PA-C  07/28/23 8038

## 2023-07-31 ENCOUNTER — TELEPHONE (OUTPATIENT)
Dept: OBGYN CLINIC | Facility: HOSPITAL | Age: 58
End: 2023-07-31

## 2023-07-31 NOTE — TELEPHONE ENCOUNTER
Patient is being referred to a orthopedics. Please schedule accordingly.     [facility]   [phone]

## 2023-08-02 ENCOUNTER — OFFICE VISIT (OUTPATIENT)
Dept: OBGYN CLINIC | Facility: CLINIC | Age: 58
End: 2023-08-02
Payer: COMMERCIAL

## 2023-08-02 ENCOUNTER — HOSPITAL ENCOUNTER (OUTPATIENT)
Dept: RADIOLOGY | Facility: CLINIC | Age: 58
Discharge: HOME | End: 2023-08-02
Payer: COMMERCIAL

## 2023-08-02 VITALS
BODY MASS INDEX: 25.71 KG/M2 | TEMPERATURE: 97.6 F | WEIGHT: 160 LBS | HEART RATE: 90 BPM | SYSTOLIC BLOOD PRESSURE: 120 MMHG | HEIGHT: 66 IN | DIASTOLIC BLOOD PRESSURE: 80 MMHG

## 2023-08-02 DIAGNOSIS — S67.192A CRUSHING INJURY OF RIGHT MIDDLE FINGER, INITIAL ENCOUNTER: ICD-10-CM

## 2023-08-02 DIAGNOSIS — M20.011 MALLET DEFORMITY OF RIGHT MIDDLE FINGER: Primary | ICD-10-CM

## 2023-08-02 DIAGNOSIS — S69.91XA INJURY OF RIGHT MIDDLE FINGER, INITIAL ENCOUNTER: ICD-10-CM

## 2023-08-02 DIAGNOSIS — M20.011 MALLET DEFORMITY OF RIGHT MIDDLE FINGER: ICD-10-CM

## 2023-08-02 PROCEDURE — 73140 X-RAY EXAM OF FINGER(S): CPT

## 2023-08-02 PROCEDURE — 99214 OFFICE O/P EST MOD 30 MIN: CPT | Performed by: STUDENT IN AN ORGANIZED HEALTH CARE EDUCATION/TRAINING PROGRAM

## 2023-08-02 PROCEDURE — 29130 APPL FINGER SPLINT STATIC: CPT | Performed by: STUDENT IN AN ORGANIZED HEALTH CARE EDUCATION/TRAINING PROGRAM

## 2023-08-02 RX ORDER — ACETAMINOPHEN 325 MG/1
650 TABLET ORAL EVERY 6 HOURS PRN
COMMUNITY

## 2023-08-02 NOTE — PROGRESS NOTES
1. Mallet deformity of right middle finger  XR finger right third digit-middle    Splint application      2. Injury of right middle finger, initial encounter  Ambulatory Referral to Hand Surgery    XR finger right third digit-middle    Splint application      3. Crushing injury of right middle finger, initial encounter  Splint application        Orders Placed This Encounter   Procedures   • Splint application   • XR finger right third digit-middle        Imaging Studies (I personally reviewed images in PACS and report):    • X-ray right middle finger 8/2/2023: No interval changes when compared to prior imaging of an occult fracture. • X-ray right middle digit 7/28/2023: No acute osseous abnormalities. There is a flexion deformity at the DIP joint of the third finger. IMPRESSION:  • Acute right middle finger pain localized at the PIP joint secondary to crushing injury at work - radiographs unremarkable other than mallet deformity. Superficial abrasion over dorsal PIP joint patient is currently on Keflex. o Reportedly, mallet finger deformity of the middle digit that has been ongoing for approximately 1 year ago -reportedly occurred while patient while finger was pulled holding onto dog leash. Reportedly had a surgery but no records found on chart review. PLAN:    • Clinical exam and radiographic imaging reviewed with patient today, with impression as per above. I have discussed with the patient the pathophysiology of this diagnosis and reviewed how the examination correlates with this diagnosis. • Prior imaging reviewed with patient today as noted above. • I reassured her that no radiographic findings of fracture seen today. I did note she has looks like a mallet deformity and she did mention injuring this digit about a year ago. I counseled due to the chronicity of this issue, conservative treatment including stack splinting may not likely alleviate her symptoms.   However, patient wants to try splinting for now and will consider surgery if it does not provide sufficient relief. Thus, stack splint was applied today. Counseled she would need to strictly stay in the splint at all times if she has any loss of extension, she would need to restart the 6-week process again. Return in about 6 weeks (around 9/13/2023). Portions of the record may have been created with voice recognition software. Occasional wrong word or "sound a like" substitutions may have occurred due to the inherent limitations of voice recognition software. Read the chart carefully and recognize, using context, where substitutions have occurred. CHIEF COMPLAINT:  Chief Complaint   Patient presents with   • Right Middle Finger - Pain, Swelling         HPI:  Stephen Burris is a 56 y.o. female  who presents for       Visit 8/2/2023:  Initial evaluation of right middle finger injury:  Of note, patient has a history of prior right middle phalanx oblique fracture in the past treated conservatively with tuyet taping in 2021. She also notes in 2022 she had a subsequent injury of her right middle finger after this digit was pulled from a dog leash causing a flexion deformity at the DIP joint. She states that she underwent a surgery for this issue but there is no report on her chart today to confirm any surgical intervention. Thus, she is always at her middle finger and a flexion positioning with inability to actively extend; passively she is able to extend her digits. Recent injury on 7/28/2023 after a can crushing incident of her digit. She had an open abrasion/wound over the dorsal aspect and is currently on Keflex antibiotics. She had initial radiographs from the ER as noted above which did not show any acute osseous abnormalities other than the flexion deformity at the DIP joint. Reports pain is mainly over the PIP joint, describes as an aching pain of mild intensity.   She reports initial stiffness of this digit but this has progressively improved. She reports initial swelling as well which is mildly receded. Medical, Surgical, Family, and Social History    Past Medical History:   Diagnosis Date   • Disease of thyroid gland    • GERD (gastroesophageal reflux disease)    • High cholesterol      Past Surgical History:   Procedure Laterality Date   • ANKLE SURGERY Left    • FOOT SURGERY Left    • HYSTERECTOMY     • SHOULDER SURGERY Right      Social History   Social History     Substance and Sexual Activity   Alcohol Use Not Currently     Social History     Substance and Sexual Activity   Drug Use Never     Social History     Tobacco Use   Smoking Status Every Day   • Packs/day: 0.50   • Types: Cigarettes   Smokeless Tobacco Never     Family History   Problem Relation Age of Onset   • Diabetes Mother    • Diabetes Father    • Heart attack Father      No Known Allergies       Physical Exam  /80 (BP Location: Left arm)   Pulse 90   Temp 97.6 °F (36.4 °C) (Temporal)   Ht 5' 6" (1.676 m)   Wt 72.6 kg (160 lb)   BMI 25.82 kg/m²     Constitutional:  see vital signs  Gen: well-developed, normocephalic/atraumatic, well-groomed  Pulmonary/Chest: Effort normal. No respiratory distress. Ortho Exam  Right middle finger exam:  Inspection: DIP flexion deformity/mallet deformity. Superficial abrasion over the dorsal aspect of the DIP joint without surrounding erythema, drainage. Mild swelling of PIP joint. Palpation: Positive tenderness at the PIP joint. ROM: Full ROM at the MCP/PIP/DIP joint of the middle digit without malrotation. Passively, I can extend DIP to full extension. Sensation is intact. Capillary refill of this digit is less than 2 seconds    Splint application    Date/Time: 8/2/2023 3:30 PM    Performed by: Xavier Prasad MD  Authorized by: Xavier Prasad MD  Universal Protocol:  Consent: Verbal consent obtained.   Risks and benefits: risks, benefits and alternatives were discussed  Consent given by: patient  Patient understanding: patient states understanding of the procedure being performed  Radiology Images displayed and confirmed. If images not available, report reviewed: imaging studies available  Required items: required blood products, implants, devices, and special equipment available      Pre-procedure details:     Sensation:  Normal  Procedure details:     Laterality:  Right    Location:  Finger    Finger:  R long finger    Splint type:  Finger splint, static (Stack splint)  Post-procedure details:     Pain:  Improved    Sensation:  Normal    Patient tolerance of procedure:   Tolerated well, no immediate complications

## 2023-09-20 ENCOUNTER — OFFICE VISIT (OUTPATIENT)
Dept: OBGYN CLINIC | Facility: CLINIC | Age: 58
End: 2023-09-20
Payer: COMMERCIAL

## 2023-09-20 VITALS
TEMPERATURE: 97.5 F | HEIGHT: 66 IN | RESPIRATION RATE: 20 BRPM | OXYGEN SATURATION: 97 % | SYSTOLIC BLOOD PRESSURE: 100 MMHG | HEART RATE: 84 BPM | WEIGHT: 162.8 LBS | BODY MASS INDEX: 26.16 KG/M2 | DIASTOLIC BLOOD PRESSURE: 80 MMHG

## 2023-09-20 DIAGNOSIS — M20.011 MALLET DEFORMITY OF RIGHT MIDDLE FINGER: Primary | ICD-10-CM

## 2023-09-20 DIAGNOSIS — S69.91XD INJURY OF RIGHT MIDDLE FINGER, SUBSEQUENT ENCOUNTER: ICD-10-CM

## 2023-09-20 PROCEDURE — 99213 OFFICE O/P EST LOW 20 MIN: CPT | Performed by: STUDENT IN AN ORGANIZED HEALTH CARE EDUCATION/TRAINING PROGRAM

## 2023-09-21 NOTE — PROGRESS NOTES
1. Mallet deformity of right middle finger        2. Injury of right middle finger, subsequent encounter          No orders of the defined types were placed in this encounter. Imaging Studies (I personally reviewed images in PACS and report):    • X-ray right middle finger 8/2/2023: No interval changes when compared to prior imaging of an occult fracture. • X-ray right middle digit 7/28/2023: No acute osseous abnormalities. There is a flexion deformity at the DIP joint of the third finger. IMPRESSION:  • Acute right middle finger pain localized at the PIP joint secondary to crushing injury at work - radiographs unremarkable other than mallet deformity. Superficial abrasion over dorsal PIP joint patient is currently on Keflex. o Reportedly, mallet finger deformity of the middle digit that has been ongoing for approximately 1 year ago -reportedly occurred while patient while finger was pulled holding onto dog leash. Reportedly had a surgery but no records found on chart review. o Clinically improved and that there is less mallet deformity on examination today. There is stiffness of this affected digit secondary to immobilization      PLAN:    • Clinical exam and radiographic imaging reviewed with patient today, with impression as per above. I have discussed with the patient the pathophysiology of this diagnosis and reviewed how the examination correlates with this diagnosis. • Reassured patient of her progressive improvement at this time. I counseled that she likely will have a slight flexion deformity at her DIP joint but otherwise should be able to use her hand as tolerated going forward. I counseled use of the Stax splint only at night while sleeping for the next 2 weeks and can discontinue thereafter. Counseled home exercises to improve the overall deconditioning/stiffness of her finger in office. • Declined need for work accommodations note.     Return if symptoms worsen or fail to improve. Portions of the record may have been created with voice recognition software. Occasional wrong word or "sound a like" substitutions may have occurred due to the inherent limitations of voice recognition software. Read the chart carefully and recognize, using context, where substitutions have occurred. CHIEF COMPLAINT:  Chief Complaint   Patient presents with   • Right Middle Finger - Follow-up         HPI:  Rosalee Aguayo is a 66 y.o. female  who presents for     Visit 9/20/2023: Follow-up evaluation of right middle finger mallet injury:  Patient reports adherence to the stack splint of her middle digit since last visit for at least a minimum of 6 weeks. She states mild discomfort over finger but otherwise denies any finger swelling, discoloration, numbness/tingling. She denies any new injuries of her middle digit since last visit. Prior Visit 8/2/2023:  Initial evaluation of right middle finger injury:  Of note, patient has a history of prior right middle phalanx oblique fracture in the past treated conservatively with tuyet taping in 2021. She also notes in 2022 she had a subsequent injury of her right middle finger after this digit was pulled from a dog leash causing a flexion deformity at the DIP joint. She states that she underwent a surgery for this issue but there is no report on her chart today to confirm any surgical intervention. Thus, she is always at her middle finger and a flexion positioning with inability to actively extend; passively she is able to extend her digits. Recent injury on 7/28/2023 after a can crushing incident of her digit. She had an open abrasion/wound over the dorsal aspect and is currently on Keflex antibiotics. She had initial radiographs from the ER as noted above which did not show any acute osseous abnormalities other than the flexion deformity at the DIP joint.   Reports pain is mainly over the PIP joint, describes as an aching pain of mild intensity. She reports initial stiffness of this digit but this has progressively improved. She reports initial swelling as well which is mildly receded. Medical, Surgical, Family, and Social History    Past Medical History:   Diagnosis Date   • Disease of thyroid gland    • GERD (gastroesophageal reflux disease)    • High cholesterol      Past Surgical History:   Procedure Laterality Date   • ANKLE SURGERY Left    • FOOT SURGERY Left    • HYSTERECTOMY     • SHOULDER SURGERY Right      Social History   Social History     Substance and Sexual Activity   Alcohol Use Not Currently     Social History     Substance and Sexual Activity   Drug Use Never     Social History     Tobacco Use   Smoking Status Every Day   • Packs/day: 0.50   • Types: Cigarettes   Smokeless Tobacco Never     Family History   Problem Relation Age of Onset   • Diabetes Mother    • Diabetes Father    • Heart attack Father      No Known Allergies       Physical Exam  /80   Pulse 84   Temp 97.5 °F (36.4 °C)   Resp 20   Ht 5' 6" (1.676 m)   Wt 73.8 kg (162 lb 12.8 oz)   SpO2 97%   BMI 26.28 kg/m²     Constitutional:  see vital signs  Gen: well-developed, normocephalic/atraumatic, well-groomed  Pulmonary/Chest: Effort normal. No respiratory distress. Ortho Exam  Right middle finger exam: - currently in stax splint (removed for examination)  Inspection: There is a slight 5 degree flexion deformity at the DIP joint which is improved since last visit. Chronically patient has a history of chronic slight flexion deformity from a prior injury in 2022. Otherwise no erythema, edema, open wounds or drainage  Palpation: Mild tenderness at the DIP joint. ROM: Full ROM at the MCP/PIP/DIP joint of the middle digit without malrotation. In regards to the right middle digit, she is able to demonstrate this motion with generalized stiffness. Sensation is intact.   Capillary refill of this digit is less than 2 seconds    Procedures

## 2023-11-11 ENCOUNTER — HOSPITAL ENCOUNTER (OUTPATIENT)
Dept: RADIOLOGY | Facility: HOSPITAL | Age: 58
Discharge: HOME | End: 2023-11-11
Payer: COMMERCIAL

## 2023-11-11 DIAGNOSIS — R07.81 RIB PAIN: ICD-10-CM

## 2023-11-11 PROCEDURE — 71101 X-RAY EXAM UNILAT RIBS/CHEST: CPT

## 2024-11-02 ENCOUNTER — APPOINTMENT (OUTPATIENT)
Dept: RADIOLOGY | Facility: HOSPITAL | Age: 59
End: 2024-11-02
Payer: COMMERCIAL

## 2024-11-02 ENCOUNTER — HOSPITAL ENCOUNTER (EMERGENCY)
Facility: HOSPITAL | Age: 59
Discharge: HOME | End: 2024-11-02
Attending: EMERGENCY MEDICINE | Admitting: EMERGENCY MEDICINE
Payer: COMMERCIAL

## 2024-11-02 VITALS
WEIGHT: 168 LBS | RESPIRATION RATE: 16 BRPM | TEMPERATURE: 97.8 F | DIASTOLIC BLOOD PRESSURE: 77 MMHG | SYSTOLIC BLOOD PRESSURE: 134 MMHG | OXYGEN SATURATION: 98 % | HEART RATE: 80 BPM | BODY MASS INDEX: 27.12 KG/M2

## 2024-11-02 DIAGNOSIS — J06.9 VIRAL URI WITH COUGH: Primary | ICD-10-CM

## 2024-11-02 LAB
FLUAV AG UPPER RESP QL IA.RAPID: NEGATIVE
FLUBV AG UPPER RESP QL IA.RAPID: NEGATIVE
SARS-COV+SARS-COV-2 AG RESP QL IA.RAPID: NEGATIVE

## 2024-11-02 PROCEDURE — 87811 SARS-COV-2 COVID19 W/OPTIC: CPT | Performed by: EMERGENCY MEDICINE

## 2024-11-02 PROCEDURE — 71046 X-RAY EXAM CHEST 2 VIEWS: CPT

## 2024-11-02 PROCEDURE — 99284 EMERGENCY DEPT VISIT MOD MDM: CPT | Performed by: EMERGENCY MEDICINE

## 2024-11-02 PROCEDURE — 87804 INFLUENZA ASSAY W/OPTIC: CPT | Performed by: EMERGENCY MEDICINE

## 2024-11-02 PROCEDURE — 99284 EMERGENCY DEPT VISIT MOD MDM: CPT

## 2024-11-02 RX ORDER — BENZONATATE 100 MG/1
100 CAPSULE ORAL EVERY 8 HOURS
Qty: 21 CAPSULE | Refills: 0 | Status: SHIPPED | OUTPATIENT
Start: 2024-11-02

## 2024-11-02 RX ORDER — ALBUTEROL SULFATE 90 UG/1
2 INHALANT RESPIRATORY (INHALATION) EVERY 6 HOURS PRN
Qty: 8.5 G | Refills: 0 | Status: SHIPPED | OUTPATIENT
Start: 2024-11-02

## 2024-11-02 RX ORDER — GUAIFENESIN/DEXTROMETHORPHAN 100-10MG/5
10 SYRUP ORAL ONCE
Status: COMPLETED | OUTPATIENT
Start: 2024-11-02 | End: 2024-11-02

## 2024-11-02 RX ORDER — BENZONATATE 100 MG/1
100 CAPSULE ORAL ONCE
Status: COMPLETED | OUTPATIENT
Start: 2024-11-02 | End: 2024-11-02

## 2024-11-02 RX ADMIN — GUAIFENESIN AND DEXTROMETHORPHAN 10 ML: 20; 200 SYRUP ORAL at 23:29

## 2024-11-02 RX ADMIN — BENZONATATE 100 MG: 100 CAPSULE ORAL at 23:29

## 2024-11-03 NOTE — ED PROVIDER NOTES
Time reflects when diagnosis was documented in both MDM as applicable and the Disposition within this note       Time User Action Codes Description Comment    11/2/2024 11:26 PM Emmy Knowles Add [J06.9] Viral URI with cough           ED Disposition       ED Disposition   Discharge    Condition   Stable    Date/Time   Sat Nov 2, 2024 11:26 PM    Comment   Meghanalbaro Oh discharge to home/self care.                   Assessment & Plan       Medical Decision Making  This patient presents with symptoms suspicious for likely viral upper respiratory infection.  Based on history and physical doubt sinusitis.  Nasal swab was sent for COVID, RSV and influenza testing which is negative.  Do not suspect any underlying cardiopulmonary process.  Chest x-ray shows no evidence of pneumonia.  Patient is nontoxic, in no acute distress and not in need of any emergent medical intervention.  Patient and/or parents told to continue good supportive care at home, follow-up with PCP as needed or return if symptoms worsen      Problems Addressed:  Viral URI with cough: acute illness or injury    Amount and/or Complexity of Data Reviewed  Labs: ordered. Decision-making details documented in ED Course.  Radiology: ordered and independent interpretation performed. Decision-making details documented in ED Course.    Risk  OTC drugs.  Prescription drug management.        ED Course as of 11/03/24 0003   Sun Nov 03, 2024   0001 FLU/COVID Rapid Antigen (30 min. TAT) - Preferred screening test in ED   0001 XR chest 2 views       Medications   benzonatate (TESSALON PERLES) capsule 100 mg (100 mg Oral Given 11/2/24 2329)   dextromethorphan-guaiFENesin (ROBITUSSIN DM) oral syrup 10 mL (10 mL Oral Given 11/2/24 2329)       ED Risk Strat Scores                           SBIRT 20yo+      Flowsheet Row Most Recent Value   Initial Alcohol Screen: US AUDIT-C     1. How often do you have a drink containing alcohol? 0 Filed at: 11/02/2024 2200   2. How  "many drinks containing alcohol do you have on a typical day you are drinking?  0 Filed at: 11/02/2024 2200   3b. FEMALE Any Age, or MALE 65+: How often do you have 4 or more drinks on one occassion? 0 Filed at: 11/02/2024 2200   Audit-C Score 0 Filed at: 11/02/2024 2200   ZACKARY: How many times in the past year have you...    Used an illegal drug or used a prescription medication for non-medical reasons? Never Filed at: 11/02/2024 2200                            History of Present Illness       Chief Complaint   Patient presents with    Cough     Cough for a few days, unsure of fever.  has pna. Niece is \"coughing like crazy\"  Pt reports she is losing her voice.       Past Medical History:   Diagnosis Date    Disease of thyroid gland     GERD (gastroesophageal reflux disease)     High cholesterol       Past Surgical History:   Procedure Laterality Date    ANKLE SURGERY Left     FOOT SURGERY Left     HYSTERECTOMY      SHOULDER SURGERY Right       Family History   Problem Relation Age of Onset    Diabetes Mother     Diabetes Father     Heart attack Father       Social History     Tobacco Use    Smoking status: Every Day     Current packs/day: 0.50     Types: Cigarettes    Smokeless tobacco: Never   Vaping Use    Vaping status: Never Used   Substance Use Topics    Alcohol use: Not Currently    Drug use: Never      E-Cigarette/Vaping    E-Cigarette Use Never User       E-Cigarette/Vaping Substances      I have reviewed and agree with the history as documented.     Patient is a 59-year-old female presenting to the emergency department, complaining of cough and congestion with left ear pain, losing her voice, symptoms been going on for the past 5 days, her  is ill with similar symptoms and was diagnosed with pneumonia, patient is a smoker, no fevers, cough is productive of yellowish phlegm at times, no nausea, vomiting or diarrhea        Review of Systems   Constitutional: Negative.    HENT:  Positive for " congestion and ear pain.    Eyes: Negative.    Respiratory:  Positive for cough and shortness of breath.    Cardiovascular: Negative.    Gastrointestinal: Negative.    Endocrine: Negative.    Genitourinary: Negative.    Musculoskeletal: Negative.    Skin: Negative.    Allergic/Immunologic: Negative.    Neurological: Negative.    Hematological: Negative.    Psychiatric/Behavioral: Negative.             Objective       ED Triage Vitals [11/02/24 2201]   Temperature Pulse Blood Pressure Respirations SpO2 Patient Position - Orthostatic VS   97.8 °F (36.6 °C) 74 144/99 18 99 % Sitting      Temp Source Heart Rate Source BP Location FiO2 (%) Pain Score    Temporal Monitor Left arm -- --      Vitals      Date and Time Temp Pulse SpO2 Resp BP Pain Score FACES Pain Rating User   11/02/24 2331 -- 80 98 % 16 134/77 -- -- AD   11/02/24 2201 97.8 °F (36.6 °C) 74 99 % 18 144/99 -- -- KK            Physical Exam  Constitutional:       Appearance: Normal appearance. She is well-developed.   HENT:      Head: Normocephalic and atraumatic.      Right Ear: Tympanic membrane normal.      Left Ear: Tympanic membrane normal.      Nose: Congestion present.      Mouth/Throat:      Mouth: Mucous membranes are moist.   Eyes:      Conjunctiva/sclera: Conjunctivae normal.      Pupils: Pupils are equal, round, and reactive to light.   Cardiovascular:      Rate and Rhythm: Normal rate and regular rhythm.      Heart sounds: Normal heart sounds.   Pulmonary:      Effort: Pulmonary effort is normal.      Breath sounds: Normal breath sounds.   Abdominal:      Palpations: Abdomen is soft.   Musculoskeletal:         General: Normal range of motion.      Cervical back: Normal range of motion and neck supple.   Skin:     General: Skin is warm and dry.   Neurological:      Mental Status: She is alert and oriented to person, place, and time.         Results Reviewed       Procedure Component Value Units Date/Time    FLU/COVID Rapid Antigen (30 min. TAT) -  Preferred screening test in ED [[id number]]  (Normal) Collected: 11/02/24 2231    Lab Status: Final result Specimen: Nares from Nose Updated: 11/02/24 2252     SARS COV Rapid Antigen Negative     Influenza A Rapid Antigen Negative     Influenza B Rapid Antigen Negative    Narrative:      This test has been performed using the [employer] Mildred 2 FLU+SARS Antigen test under the Emergency Use Authorization (EUA). This test has been validated by the  and verified by the performing laboratory. The Mildred uses lateral flow immunofluorescent sandwich assay to detect SARS-COV, Influenza A and Influenza B Antigen.     The [employer] Mildred 2 SARS Antigen test does not differentiate between SARS-CoV and SARS-CoV-2.     Negative results are presumptive and may be confirmed with a molecular assay, if necessary, for patient management. Negative results do not rule out SARS-CoV-2 or influenza infection and should not be used as the sole basis for treatment or patient management decisions. A negative test result may occur if the level of antigen in a sample is below the limit of detection of this test.     Positive results are indicative of the presence of viral antigens, but do not rule out bacterial infection or co-infection with other viruses.     All test results should be used as an adjunct to clinical observations and other information available to the provider.    FOR PEDIATRIC PATIENTS - copy/paste COVID Guidelines URL to browser: [email or web address]            XR chest 2 views   ED Interpretation by Emmy Knowles DO (11/03 0001)   No acute findings          Procedures    ED Medication and Procedure Management   Prior to Admission Medications   Prescriptions Last Dose Informant Patient Reported? Taking?   acetaminophen (TYLENOL) 325 mg tablet   Yes No   Sig: Take 650 mg by mouth every 6 (six) hours as needed for mild pain   Patient not taking: Reported on 9/20/2023    atorvastatin (LIPITOR) 40 mg tablet   Yes No   Sig: Take 40 mg by mouth daily   cephalexin (KEFLEX) 500 mg capsule   Yes No   Sig: TAKE 1 CAPSULE BY MOUTH IN THE MORNING AND 1 CAPSULE AT NOON AND 1 CAPSULE BEFORE BEDTIME FOR 7 DAYS   Patient not taking: Reported on 9/20/2023   diclofenac sodium (VOLTAREN) 50 mg EC tablet   Yes No   famotidine (PEPCID) 20 mg tablet   Yes No   Sig: Take 20 mg by mouth 2 (two) times a day   fluticasone (FLONASE) 50 mcg/act nasal spray   Yes No   Sig: SPRAY 2 SPRAYS INTO EACH NOSTRIL IN THE MORNING   Patient not taking: Reported on 8/2/2023   gabapentin (NEURONTIN) 300 mg capsule   Yes No   Sig: TAKE 1 CAPSULE BY MOUTH IN THE MORNING AND AFTERNOON AND 2 CAPSULES AT BEDTIME   levothyroxine 150 mcg tablet   Yes No   Sig: TAKE 1 TABLET BY MOUTH EVERY DAY AT LEAST 30 MINUTES PRIOR TO BREAKFAST OR OTHER MEDS   methylPREDNISolone 4 MG tablet therapy pack   Yes No   Sig: TAKE 6 TABLETS ON DAY 1 AS DIRECTED ON PACKAGE AND DECREASE BY 1 TAB EACH DAY FOR A TOTAL OF 6 DAYS   Patient not taking: Reported on 9/20/2023   varenicline (CHANTIX) 1 mg tablet   Yes No   Sig: TAKE BY MOUTH 1 TABLET IN THE MORNING AND 1 TABLET BEFORE BEDTIME. AS DIRECTED ON BOX..   Patient not taking: Reported on 9/20/2023      Facility-Administered Medications: None     Discharge Medication List as of 11/2/2024 11:27 PM        START taking these medications    Details   albuterol (ProAir HFA) 90 mcg/act inhaler Inhale 2 puffs every 6 (six) hours as needed for wheezing, Starting Sat 11/2/2024, Normal      benzonatate (TESSALON PERLES) 100 mg capsule Take 1 capsule (100 mg total) by mouth every 8 (eight) hours, Starting Sat 11/2/2024, Normal           CONTINUE these medications which have NOT CHANGED    Details   acetaminophen (TYLENOL) 325 mg tablet Take 650 mg by mouth every 6 (six) hours as needed for mild pain, Historical Med      atorvastatin (LIPITOR) 40 mg tablet Take 40 mg by mouth daily, Starting Mon 5/25/2020,  Historical Med      cephalexin (KEFLEX) 500 mg capsule TAKE 1 CAPSULE BY MOUTH IN THE MORNING AND 1 CAPSULE AT NOON AND 1 CAPSULE BEFORE BEDTIME FOR 7 DAYS, Historical Med      diclofenac sodium (VOLTAREN) 50 mg EC tablet Starting Sun 7/4/2021, Historical Med      famotidine (PEPCID) 20 mg tablet Take 20 mg by mouth 2 (two) times a day, Historical Med      fluticasone (FLONASE) 50 mcg/act nasal spray SPRAY 2 SPRAYS INTO EACH NOSTRIL IN THE MORNING, Historical Med      gabapentin (NEURONTIN) 300 mg capsule TAKE 1 CAPSULE BY MOUTH IN THE MORNING AND AFTERNOON AND 2 CAPSULES AT BEDTIME, Historical Med      levothyroxine 150 mcg tablet TAKE 1 TABLET BY MOUTH EVERY DAY AT LEAST 30 MINUTES PRIOR TO BREAKFAST OR OTHER MEDS, Historical Med      methylPREDNISolone 4 MG tablet therapy pack TAKE 6 TABLETS ON DAY 1 AS DIRECTED ON PACKAGE AND DECREASE BY 1 TAB EACH DAY FOR A TOTAL OF 6 DAYS, Historical Med      varenicline (CHANTIX) 1 mg tablet TAKE BY MOUTH 1 TABLET IN THE MORNING AND 1 TABLET BEFORE BEDTIME. AS DIRECTED ON BOX.., Historical Med           No discharge procedures on file.  ED SEPSIS DOCUMENTATION   Time reflects when diagnosis was documented in both MDM as applicable and the Disposition within this note       Time User Action Codes Description Comment    11/2/2024 11:26 PM Emmy Knowles Add [J06.9] Viral URI with cough                  Emmy Knowles DO  11/03/24 0003

## 2025-01-30 ENCOUNTER — HOSPITAL ENCOUNTER (EMERGENCY)
Facility: HOSPITAL | Age: 60
Discharge: HOME | End: 2025-01-30
Attending: EMERGENCY MEDICINE
Payer: COMMERCIAL

## 2025-01-30 ENCOUNTER — APPOINTMENT (EMERGENCY)
Dept: CT IMAGING | Facility: HOSPITAL | Age: 60
End: 2025-01-30
Payer: COMMERCIAL

## 2025-01-30 VITALS
HEART RATE: 92 BPM | WEIGHT: 150 LBS | RESPIRATION RATE: 20 BRPM | DIASTOLIC BLOOD PRESSURE: 71 MMHG | HEIGHT: 66 IN | BODY MASS INDEX: 24.11 KG/M2 | TEMPERATURE: 97.4 F | OXYGEN SATURATION: 98 % | SYSTOLIC BLOOD PRESSURE: 112 MMHG

## 2025-01-30 DIAGNOSIS — S22.32XA CLOSED FRACTURE OF ONE RIB OF LEFT SIDE, INITIAL ENCOUNTER: ICD-10-CM

## 2025-01-30 DIAGNOSIS — J40 BRONCHITIS: Primary | ICD-10-CM

## 2025-01-30 LAB
ALBUMIN SERPL BCG-MCNC: 3.8 G/DL (ref 3.5–5)
ALP SERPL-CCNC: 134 U/L (ref 34–104)
ALT SERPL W P-5'-P-CCNC: 20 U/L (ref 7–52)
ANION GAP SERPL CALCULATED.3IONS-SCNC: 7 MMOL/L (ref 4–13)
APTT PPP: 27 SECONDS (ref 23–34)
AST SERPL W P-5'-P-CCNC: 21 U/L (ref 13–39)
ATRIAL RATE: 81 BPM
BASOPHILS # BLD AUTO: 0.08 THOUSANDS/ΜL (ref 0–0.1)
BASOPHILS NFR BLD AUTO: 1 % (ref 0–1)
BILIRUB SERPL-MCNC: 0.33 MG/DL (ref 0.2–1)
BNP SERPL-MCNC: 24 PG/ML (ref 0–100)
BUN SERPL-MCNC: 6 MG/DL (ref 5–25)
CALCIUM SERPL-MCNC: 9.2 MG/DL (ref 8.4–10.2)
CARDIAC TROPONIN I PNL SERPL HS: <2 NG/L (ref ?–50)
CHLORIDE SERPL-SCNC: 103 MMOL/L (ref 96–108)
CO2 SERPL-SCNC: 26 MMOL/L (ref 21–32)
CREAT SERPL-MCNC: 0.43 MG/DL (ref 0.6–1.3)
EOSINOPHIL # BLD AUTO: 0.32 THOUSAND/ΜL (ref 0–0.61)
EOSINOPHIL NFR BLD AUTO: 4 % (ref 0–6)
ERYTHROCYTE [DISTWIDTH] IN BLOOD BY AUTOMATED COUNT: 13.7 % (ref 11.6–15.1)
FLUAV AG UPPER RESP QL IA.RAPID: NEGATIVE
FLUBV AG UPPER RESP QL IA.RAPID: NEGATIVE
GFR SERPL CREATININE-BSD FRML MDRD: 113 ML/MIN/1.73SQ M
GLUCOSE SERPL-MCNC: 111 MG/DL (ref 65–140)
HCT VFR BLD AUTO: 39.6 % (ref 34.8–46.1)
HGB BLD-MCNC: 13.3 G/DL (ref 11.5–15.4)
IMM GRANULOCYTES # BLD AUTO: 0.04 THOUSAND/UL (ref 0–0.2)
IMM GRANULOCYTES NFR BLD AUTO: 1 % (ref 0–2)
INR PPP: 0.77 (ref 0.85–1.19)
LACTATE SERPL-SCNC: 1.1 MMOL/L (ref 0.5–2)
LIPASE SERPL-CCNC: 80 U/L (ref 11–82)
LYMPHOCYTES # BLD AUTO: 3.46 THOUSANDS/ΜL (ref 0.6–4.47)
LYMPHOCYTES NFR BLD AUTO: 39 % (ref 14–44)
MAGNESIUM SERPL-MCNC: 1.9 MG/DL (ref 1.9–2.7)
MCH RBC QN AUTO: 32.6 PG (ref 26.8–34.3)
MCHC RBC AUTO-ENTMCNC: 33.6 G/DL (ref 31.4–37.4)
MCV RBC AUTO: 97 FL (ref 82–98)
MONOCYTES # BLD AUTO: 0.73 THOUSAND/ΜL (ref 0.17–1.22)
MONOCYTES NFR BLD AUTO: 8 % (ref 4–12)
NEUTROPHILS # BLD AUTO: 4.24 THOUSANDS/ΜL (ref 1.85–7.62)
NEUTS SEG NFR BLD AUTO: 47 % (ref 43–75)
NRBC BLD AUTO-RTO: 0 /100 WBCS
P AXIS: 78 DEGREES
PLATELET # BLD AUTO: 308 THOUSANDS/UL (ref 149–390)
PMV BLD AUTO: 9.4 FL (ref 8.9–12.7)
POTASSIUM SERPL-SCNC: 3.7 MMOL/L (ref 3.5–5.3)
PR INTERVAL: 192 MS
PROT SERPL-MCNC: 7.3 G/DL (ref 6.4–8.4)
PROTHROMBIN TIME: 11.2 SECONDS (ref 12.3–15)
QRS AXIS: 63 DEGREES
QRSD INTERVAL: 78 MS
QT INTERVAL: 370 MS
QTC INTERVAL: 429 MS
RBC # BLD AUTO: 4.08 MILLION/UL (ref 3.81–5.12)
SARS-COV+SARS-COV-2 AG RESP QL IA.RAPID: NEGATIVE
SODIUM SERPL-SCNC: 136 MMOL/L (ref 135–147)
T WAVE AXIS: 69 DEGREES
VENTRICULAR RATE: 81 BPM
WBC # BLD AUTO: 8.87 THOUSAND/UL (ref 4.31–10.16)

## 2025-01-30 PROCEDURE — 99284 EMERGENCY DEPT VISIT MOD MDM: CPT

## 2025-01-30 PROCEDURE — 87811 SARS-COV-2 COVID19 W/OPTIC: CPT | Performed by: EMERGENCY MEDICINE

## 2025-01-30 PROCEDURE — 71275 CT ANGIOGRAPHY CHEST: CPT

## 2025-01-30 PROCEDURE — 99285 EMERGENCY DEPT VISIT HI MDM: CPT | Performed by: EMERGENCY MEDICINE

## 2025-01-30 PROCEDURE — 83735 ASSAY OF MAGNESIUM: CPT | Performed by: EMERGENCY MEDICINE

## 2025-01-30 PROCEDURE — 87804 INFLUENZA ASSAY W/OPTIC: CPT | Performed by: EMERGENCY MEDICINE

## 2025-01-30 PROCEDURE — 93010 ELECTROCARDIOGRAM REPORT: CPT | Performed by: INTERNAL MEDICINE

## 2025-01-30 PROCEDURE — 85730 THROMBOPLASTIN TIME PARTIAL: CPT | Performed by: EMERGENCY MEDICINE

## 2025-01-30 PROCEDURE — 83880 ASSAY OF NATRIURETIC PEPTIDE: CPT | Performed by: EMERGENCY MEDICINE

## 2025-01-30 PROCEDURE — 36415 COLL VENOUS BLD VENIPUNCTURE: CPT | Performed by: EMERGENCY MEDICINE

## 2025-01-30 PROCEDURE — 84484 ASSAY OF TROPONIN QUANT: CPT | Performed by: EMERGENCY MEDICINE

## 2025-01-30 PROCEDURE — 96374 THER/PROPH/DIAG INJ IV PUSH: CPT

## 2025-01-30 PROCEDURE — 85025 COMPLETE CBC W/AUTO DIFF WBC: CPT | Performed by: EMERGENCY MEDICINE

## 2025-01-30 PROCEDURE — 93005 ELECTROCARDIOGRAM TRACING: CPT

## 2025-01-30 PROCEDURE — 80053 COMPREHEN METABOLIC PANEL: CPT | Performed by: EMERGENCY MEDICINE

## 2025-01-30 PROCEDURE — 83690 ASSAY OF LIPASE: CPT | Performed by: EMERGENCY MEDICINE

## 2025-01-30 PROCEDURE — 85610 PROTHROMBIN TIME: CPT | Performed by: EMERGENCY MEDICINE

## 2025-01-30 PROCEDURE — 83605 ASSAY OF LACTIC ACID: CPT | Performed by: EMERGENCY MEDICINE

## 2025-01-30 RX ORDER — KETOROLAC TROMETHAMINE 30 MG/ML
15 INJECTION, SOLUTION INTRAMUSCULAR; INTRAVENOUS ONCE
Status: DISCONTINUED | OUTPATIENT
Start: 2025-01-30 | End: 2025-01-30

## 2025-01-30 RX ORDER — OXYCODONE AND ACETAMINOPHEN 5; 325 MG/1; MG/1
1 TABLET ORAL EVERY 4 HOURS PRN
Qty: 10 TABLET | Refills: 0 | Status: SHIPPED | OUTPATIENT
Start: 2025-01-30

## 2025-01-30 RX ORDER — CEFUROXIME AXETIL 500 MG/1
500 TABLET ORAL EVERY 12 HOURS SCHEDULED
Qty: 20 TABLET | Refills: 0 | Status: SHIPPED | OUTPATIENT
Start: 2025-01-30 | End: 2025-02-09

## 2025-01-30 RX ORDER — KETOROLAC TROMETHAMINE 30 MG/ML
1 INJECTION, SOLUTION INTRAMUSCULAR; INTRAVENOUS ONCE
Status: COMPLETED | OUTPATIENT
Start: 2025-01-30 | End: 2025-01-30

## 2025-01-30 RX ORDER — HYDROCODONE POLISTIREX AND CHLORPHENIRAMINE POLISTIREX 10; 8 MG/5ML; MG/5ML
5 SUSPENSION, EXTENDED RELEASE ORAL EVERY 12 HOURS PRN
Qty: 100 ML | Refills: 0 | Status: SHIPPED | OUTPATIENT
Start: 2025-01-30 | End: 2025-01-30 | Stop reason: CLARIF

## 2025-01-30 RX ADMIN — IOHEXOL 85 ML: 350 INJECTION, SOLUTION INTRAVENOUS at 01:13

## 2025-01-30 RX ADMIN — MORPHINE SULFATE 2 MG: 2 INJECTION, SOLUTION INTRAMUSCULAR; INTRAVENOUS at 01:02

## 2025-01-30 NOTE — ED PROVIDER NOTES
"Time reflects when diagnosis was documented in both MDM as applicable and the Disposition within this note       Time User Action Codes Description Comment    1/30/2025  1:32 AM VisLisa watsono P Add [R07.81] Rib pain on left side     1/30/2025  1:32 AM Visperas, Jarret P Add [J40] Bronchitis     1/30/2025  2:43 AM Visperas, Jarret P Add [S22.32XA] Closed fracture of one rib of left side, initial encounter     1/30/2025  2:43 AM Visperas, Jarret P Modify [J40] Bronchitis     1/30/2025  2:43 AM Visperas, Jarret P Remove [R07.81] Rib pain on left side           ED Disposition       ED Disposition   Discharge    Condition   Stable    Date/Time   Thu Jan 30, 2025  2:45 AM    Comment   Meghan Oh discharge to home/self care.                   Assessment & Plan       Medical Decision Making  Amount and/or Complexity of Data Reviewed  Labs: ordered. Decision-making details documented in ED Course.  Radiology: ordered. Decision-making details documented in ED Course.  ECG/medicine tests: ordered and independent interpretation performed. Decision-making details documented in ED Course.  Discussion of management or test interpretation with external provider(s): Differential diagnosis includes but not limited to STEMI, NSTEMI, PE, pneumonia, pneumothorax, musculoskeletal chest pain, costochondritis, gastritis, cholelithiasis, contusion, strain    Risk  Prescription drug management.             Medications   ketorolac (FOR EMS ONLY) (TORADOL) injection 30 mg (0 mg Does not apply Given to EMS 1/30/25 0047)   morphine injection 2 mg (2 mg Intravenous Given 1/30/25 0102)   iohexol (OMNIPAQUE) 350 MG/ML injection (MULTI-DOSE) 85 mL (85 mL Intravenous Given 1/30/25 0113)       ED Risk Strat Scores                                              History of Present Illness       Chief Complaint   Patient presents with    Rib Pain     C/o left anterior rib pain x 1hr. States she \"felt a pop\" after coughing       Past Medical History: "   Diagnosis Date    Diabetes mellitus (HCC)     Disease of thyroid gland     GERD (gastroesophageal reflux disease)     High cholesterol       Past Surgical History:   Procedure Laterality Date    ANKLE SURGERY Left     FOOT SURGERY Left     HYSTERECTOMY      SHOULDER SURGERY Right       Family History   Problem Relation Age of Onset    Diabetes Mother     Diabetes Father     Heart attack Father       Social History     Tobacco Use    Smoking status: Every Day     Current packs/day: 0.50     Types: Cigarettes    Smokeless tobacco: Never   Vaping Use    Vaping status: Never Used   Substance Use Topics    Alcohol use: Not Currently    Drug use: Never      E-Cigarette/Vaping    E-Cigarette Use Never User       E-Cigarette/Vaping Substances      I have reviewed and agree with the history as documented.     Sick for the past 1 week.  Congested and coughing up greenish-yellow phlegm.  Does smoke.  Tonight she coughed and felt a pop on the left side of her chest.  Worse with movement.  Nothing taken prior to arrival.  No history of PE or DVT.  Is currently wearing a walking boot to the left foot.      History provided by:  Patient   used: No    Cough  Cough characteristics:  Productive  Sputum characteristics:  Green and yellow  Severity:  Mild  Onset quality:  Gradual  Duration:  1 week  Timing:  Constant  Progression:  Worsening  Chronicity:  New  Smoker: yes    Context: smoke exposure and upper respiratory infection    Relieved by:  Nothing  Worsened by:  Deep breathing  Ineffective treatments:  None tried  Associated symptoms: chest pain, rhinorrhea and shortness of breath    Associated symptoms: no chills, no ear pain, no eye discharge, no fever, no headaches, no rash, no sore throat and no wheezing        Review of Systems   Constitutional:  Negative for chills and fever.   HENT:  Positive for rhinorrhea. Negative for ear pain, hearing loss, sore throat, trouble swallowing and voice change.     Eyes:  Negative for pain and discharge.   Respiratory:  Positive for cough and shortness of breath. Negative for wheezing.    Cardiovascular:  Positive for chest pain. Negative for palpitations.   Gastrointestinal:  Negative for abdominal pain, blood in stool, constipation, diarrhea, nausea and vomiting.   Genitourinary:  Negative for dysuria, flank pain, frequency and hematuria.   Musculoskeletal:  Negative for joint swelling, neck pain and neck stiffness.   Skin:  Negative for rash and wound.   Neurological:  Negative for dizziness, seizures, syncope, facial asymmetry and headaches.   Psychiatric/Behavioral:  Negative for hallucinations, self-injury and suicidal ideas.    All other systems reviewed and are negative.          Objective       ED Triage Vitals [01/30/25 0047]   Temperature Pulse Blood Pressure Respirations SpO2 Patient Position - Orthostatic VS   (!) 97.4 °F (36.3 °C) 95 (!) 175/77 20 96 % Sitting      Temp Source Heart Rate Source BP Location FiO2 (%) Pain Score    Temporal Monitor Left arm -- 10 - Worst Possible Pain      Vitals      Date and Time Temp Pulse SpO2 Resp BP Pain Score FACES Pain Rating User   01/30/25 0200 -- 92 98 % 20 112/71 -- -- TH 01/30/25 0102 -- -- -- -- -- 8 -- TH 01/30/25 0047 97.4 °F (36.3 °C) 95 96 % 20 175/77 10 - Worst Possible Pain --             Physical Exam  Vitals and nursing note reviewed.   Constitutional:       General: She is not in acute distress.     Appearance: She is well-developed.   HENT:      Head: Normocephalic and atraumatic.      Right Ear: External ear normal.      Left Ear: External ear normal.   Eyes:      General: No scleral icterus.        Right eye: No discharge.         Left eye: No discharge.      Extraocular Movements: Extraocular movements intact.      Conjunctiva/sclera: Conjunctivae normal.   Cardiovascular:      Rate and Rhythm: Normal rate and regular rhythm.      Heart sounds: Normal heart sounds. No murmur heard.  Pulmonary:       Effort: Pulmonary effort is normal.      Breath sounds: Normal breath sounds. No wheezing or rales.   Abdominal:      General: Bowel sounds are normal. There is no distension.      Palpations: Abdomen is soft.      Tenderness: There is no abdominal tenderness. There is no guarding or rebound.   Musculoskeletal:         General: No deformity. Normal range of motion.      Cervical back: Normal range of motion and neck supple.   Skin:     General: Skin is warm and dry.      Findings: No rash.   Neurological:      General: No focal deficit present.      Mental Status: She is alert and oriented to person, place, and time.      Cranial Nerves: No cranial nerve deficit.   Psychiatric:         Mood and Affect: Mood normal.         Behavior: Behavior normal.         Thought Content: Thought content normal.         Judgment: Judgment normal.         Results Reviewed       Procedure Component Value Units Date/Time    B-Type Natriuretic Peptide(BNP) [[id number]]  (Normal) Collected: 01/30/25 0058    Lab Status: Final result Specimen: Blood from Arm, Left Updated: 01/30/25 0216     BNP 24 pg/mL     HS Troponin 0hr (reflex protocol) [[id number]]  (Normal) Collected: 01/30/25 0058    Lab Status: Final result Specimen: Blood from Arm, Left Updated: 01/30/25 0134     hs TnI 0hr <2 ng/L     Lactic acid, plasma (w/reflex if result > 2.0) [[id number]]  (Normal) Collected: 01/30/25 0058    Lab Status: Final result Specimen: Blood from Arm, Left Updated: 01/30/25 0130     LACTIC ACID 1.1 mmol/L     Narrative:      Result may be elevated if tourniquet was used during collection.    Comprehensive metabolic panel [[id number]]  (Abnormal) Collected: 01/30/25 0058    Lab Status: Final result Specimen: Blood from Arm, Left Updated: 01/30/25 0130     Sodium 136 mmol/L      Potassium 3.7 mmol/L      Chloride 103 mmol/L      CO2 26 mmol/L      ANION GAP 7 mmol/L      BUN 6 mg/dL      Creatinine 0.43 mg/dL      Glucose 111 mg/dL      Calcium 9.2 mg/dL       AST 21 U/L      ALT 20 U/L      Alkaline Phosphatase 134 U/L      Total Protein 7.3 g/dL      Albumin 3.8 g/dL      Total Bilirubin 0.33 mg/dL      eGFR 113 ml/min/1.73sq m     Narrative:      National Kidney Disease Foundation guidelines for Chronic Kidney Disease (CKD):     Stage 1 with normal or high GFR (GFR > 90 mL/min/1.73 square meters)    Stage 2 Mild CKD (GFR = 60-89 mL/min/1.73 square meters)    Stage 3A Moderate CKD (GFR = 45-59 mL/min/1.73 square meters)    Stage 3B Moderate CKD (GFR = 30-44 mL/min/1.73 square meters)    Stage 4 Severe CKD (GFR = 15-29 mL/min/1.73 square meters)    Stage 5 End Stage CKD (GFR <15 mL/min/1.73 square meters)  Note: GFR calculation is accurate only with a steady state creatinine    Magnesium [[id number]]  (Normal) Collected: 01/30/25 0058    Lab Status: Final result Specimen: Blood from Arm, Left Updated: 01/30/25 0130     Magnesium 1.9 mg/dL     Lipase [[id number]]  (Normal) Collected: 01/30/25 0058    Lab Status: Final result Specimen: Blood from Arm, Left Updated: 01/30/25 0130     Lipase 80 u/L     Protime-INR [[id number]]  (Abnormal) Collected: 01/30/25 0058    Lab Status: Final result Specimen: Blood from Arm, Left Updated: 01/30/25 0126     Protime 11.2 seconds      INR 0.77    Narrative:      INR Therapeutic Range    Indication                                             INR Range      Atrial Fibrillation                                               2.0-3.0  Hypercoagulable State                                    2.0.2.3  Left Ventricular Asist Device                            2.0-3.0  Mechanical Heart Valve                                  -    Aortic(with afib, MI, embolism, HF, LA enlargement,    and/or coagulopathy)                                     2.0-3.0 (2.5-3.5)     Mitral                                                             2.5-3.5  Prosthetic/Bioprosthetic Heart Valve               2.0-3.0  Venous thromboembolism (VTE: VT, PE        2.0-3.0     APTT [[id number]]  (Normal) Collected: 01/30/25 0058    Lab Status: Final result Specimen: Blood from Arm, Left Updated: 01/30/25 0126     PTT 27 seconds     FLU/COVID Rapid Antigen (30 min. TAT) - Preferred screening test in ED [[id number]]  (Normal) Collected: 01/30/25 0058    Lab Status: Final result Specimen: Nares from Nose Updated: 01/30/25 0126     SARS COV Rapid Antigen Negative     Influenza A Rapid Antigen Negative     Influenza B Rapid Antigen Negative    Narrative:      This test has been performed using the [employer] Mildred 2 FLU+SARS Antigen test under the Emergency Use Authorization (EUA). This test has been validated by the  and verified by the performing laboratory. The Mildred uses lateral flow immunofluorescent sandwich assay to detect SARS-COV, Influenza A and Influenza B Antigen.     The [employer] Mildred 2 SARS Antigen test does not differentiate between SARS-CoV and SARS-CoV-2.     Negative results are presumptive and may be confirmed with a molecular assay, if necessary, for patient management. Negative results do not rule out SARS-CoV-2 or influenza infection and should not be used as the sole basis for treatment or patient management decisions. A negative test result may occur if the level of antigen in a sample is below the limit of detection of this test.     Positive results are indicative of the presence of viral antigens, but do not rule out bacterial infection or co-infection with other viruses.     All test results should be used as an adjunct to clinical observations and other information available to the provider.    FOR PEDIATRIC PATIENTS - copy/paste COVID Guidelines URL to browser: [email or web address]    CBC and differential [[id number]] Collected: 01/30/25 0058    Lab Status: Final result Specimen: Blood from Arm, Left Updated: 01/30/25 0110     WBC 8.87 Thousand/uL      RBC 4.08 Million/uL      Hemoglobin 13.3 g/dL       Hematocrit 39.6 %      MCV 97 fL      MCH 32.6 pg      MCHC 33.6 g/dL      RDW 13.7 %      MPV 9.4 fL      Platelets 308 Thousands/uL      nRBC 0 /100 WBCs      Segmented % 47 %      Immature Grans % 1 %      Lymphocytes % 39 %      Monocytes % 8 %      Eosinophils Relative 4 %      Basophils Relative 1 %      Absolute Neutrophils 4.24 Thousands/µL      Absolute Immature Grans 0.04 Thousand/uL      Absolute Lymphocytes 3.46 Thousands/µL      Absolute Monocytes 0.73 Thousand/µL      Eosinophils Absolute 0.32 Thousand/µL      Basophils Absolute 0.08 Thousands/µL             CTA chest pe study   Final Interpretation by Dickson Sevilla DO (01/30 0239)      No acute pulmonary bolus.      Acute nondisplaced fracture left posterior ninth rib               The study was marked in EPIC for immediate notification.      Workstation performed: [employer]             ECG 12 Lead Documentation Only    Date/Time: 1/30/2025 1:00 AM    Performed by: Jarret Gary MD  Authorized by: Jarret Gary MD    ECG reviewed by me, the ED Provider: yes    Patient location:  ED  Rate:     ECG rate:  80  Rhythm:     Rhythm: sinus rhythm    Ectopy:     Ectopy: none    QRS:     QRS axis:  Normal      ED Medication and Procedure Management   Prior to Admission Medications   Prescriptions Last Dose Informant Patient Reported? Taking?   acetaminophen (TYLENOL) 325 mg tablet   Yes No   Sig: Take 650 mg by mouth every 6 (six) hours as needed for mild pain   Patient not taking: Reported on 9/20/2023   albuterol (ProAir HFA) 90 mcg/act inhaler   No No   Sig: Inhale 2 puffs every 6 (six) hours as needed for wheezing   atorvastatin (LIPITOR) 40 mg tablet   Yes No   Sig: Take 40 mg by mouth daily   benzonatate (TESSALON PERLES) 100 mg capsule   No No   Sig: Take 1 capsule (100 mg total) by mouth every 8 (eight) hours   cephalexin (KEFLEX) 500 mg capsule   Yes No   Sig: TAKE 1 CAPSULE BY MOUTH IN THE MORNING AND 1 CAPSULE AT NOON AND 1 CAPSULE BEFORE  BEDTIME FOR 7 DAYS   Patient not taking: Reported on 9/20/2023   diclofenac sodium (VOLTAREN) 50 mg EC tablet   Yes No   famotidine (PEPCID) 20 mg tablet   Yes No   Sig: Take 20 mg by mouth 2 (two) times a day   fluticasone (FLONASE) 50 mcg/act nasal spray   Yes No   Sig: SPRAY 2 SPRAYS INTO EACH NOSTRIL IN THE MORNING   Patient not taking: Reported on 8/2/2023   gabapentin (NEURONTIN) 300 mg capsule   Yes No   Sig: TAKE 1 CAPSULE BY MOUTH IN THE MORNING AND AFTERNOON AND 2 CAPSULES AT BEDTIME   levothyroxine 150 mcg tablet   Yes No   Sig: TAKE 1 TABLET BY MOUTH EVERY DAY AT LEAST 30 MINUTES PRIOR TO BREAKFAST OR OTHER MEDS   methylPREDNISolone 4 MG tablet therapy pack   Yes No   Sig: TAKE 6 TABLETS ON DAY 1 AS DIRECTED ON PACKAGE AND DECREASE BY 1 TAB EACH DAY FOR A TOTAL OF 6 DAYS   Patient not taking: Reported on 9/20/2023   varenicline (CHANTIX) 1 mg tablet   Yes No   Sig: TAKE BY MOUTH 1 TABLET IN THE MORNING AND 1 TABLET BEFORE BEDTIME. AS DIRECTED ON BOX..   Patient not taking: Reported on 9/20/2023      Facility-Administered Medications: None     Patient's Medications   Discharge Prescriptions    CEFUROXIME (CEFTIN) 500 MG TABLET    Take 1 tablet (500 mg total) by mouth every 12 (twelve) hours for 10 days       Start Date: 1/30/2025 End Date: 2/9/2025       Order Dose: 500 mg       Quantity: 20 tablet    Refills: 0    OXYCODONE-ACETAMINOPHEN (PERCOCET) 5-325 MG PER TABLET    Take 1 tablet by mouth every 4 (four) hours as needed for moderate pain for up to 10 doses Max Daily Amount: 6 tablets       Start Date: 1/30/2025 End Date: --       Order Dose: 1 tablet       Quantity: 10 tablet    Refills: 0     No discharge procedures on file.  ED SEPSIS DOCUMENTATION   Time reflects when diagnosis was documented in both MDM as applicable and the Disposition within this note       Time User Action Codes Description Comment    1/30/2025  1:32 AM Jarret Gary Add [R07.81] Rib pain on left side     1/30/2025  1:32  AM Jarret Gary Add [J40] Bronchitis     1/30/2025  2:43 AM Jarret Gary Add [S22.32XA] Closed fracture of one rib of left side, initial encounter     1/30/2025  2:43 AM Jarret Gary Modify [J40] Bronchitis     1/30/2025  2:43 AM Jarret Gary Remove [R07.81] Rib pain on left side                  Jarret Gary MD  01/30/25 0245

## 2025-01-30 NOTE — DISCHARGE INSTRUCTIONS
If you take Percocet please do not take Tylenol/acetaminophen in addition.  The Percocet already has Tylenol/acetaminophen in it.    Patient Education     Bronchitis, Adult ED   General Information   You came to the Emergency Department (ED) for acute bronchitis. This is an infection of the bronchi which are tubes that carry air into your lungs. Most of the time, this infection is caused by a virus so an antibiotic won’t help. Your cough should get better within 2 to 3 weeks, but may take a bit longer.  What care is needed at home?   Call your regular doctor to let them know you were in the ED. Make a follow-up appointment if you were told to.  Do not smoke or be in smoke-filled places. Avoid other things that may cause breathing problems like fumes, pollution, dust, and other common allergens.  Drink lots of water, juice, or broth to replace fluids lost in runny nose and fever.  If you have medicines to take when you are feeling short of breath, be sure to carry them with you. Then, you can take them when needed.  If you smoke, stop.  Take warm, steamy showers to help soothe the cough.  Use a cool mist humidifier. This may make it easier to breathe.  Use hard candy or cough drops to soothe sore throat and cough.  Wash your hands often. This will help prevent you from spreading germs to others.  When do I need to get emergency help?   Call for an ambulance right away if:   You are having so much trouble breathing that you can only say one or two words at a time.  You need to sit upright at all times to be able to breathe and or cannot lie down.  Return to the ED if:   You have trouble breathing when talking or sitting still.  When do I need to call the doctor?   You have a fever of 100.4°F (38°C) or higher or chills.  You have chest pain when you cough, have trouble breathing but can still talk in full sentences, or cough up blood.  You develop a barking cough.  You are still coughing in 3 weeks.  You have new or  worsening symptoms.  Last Reviewed Date   2020-07-22  Consumer Information Use and Disclaimer   This generalized information is a limited summary of diagnosis, treatment, and/or medication information. It is not meant to be comprehensive and should be used as a tool to help the user understand and/or assess potential diagnostic and treatment options. It does NOT include all information about conditions, treatments, medications, side effects, or risks that may apply to a specific patient. It is not intended to be medical advice or a substitute for the medical advice, diagnosis, or treatment of a health care provider based on the health care provider's examination and assessment of a patient’s specific and unique circumstances. Patients must speak with a health care provider for complete information about their health, medical questions, and treatment options, including any risks or benefits regarding use of medications. This information does not endorse any treatments or medications as safe, effective, or approved for treating a specific patient. UpToDate, Inc. and its affiliates disclaim any warranty or liability relating to this information or the use thereof. The use of this information is governed by the Terms of Use, available at https://www.[employer]s.com/en/know/clinical-effectiveness-terms   Copyright   Copyright © 2024 UpToDate, Inc. and its affiliates and/or licensors. All rights reserved.  Patient Education     Rib Fracture or Bruised Rib ED   General Information   You came to the Emergency Department (ED) for broken or bruised ribs. The medical name for this is rib fracture or rib contusion. Your ribs are a group of bones that wrap around your chest. They protect the organs inside your chest like your heart and lungs. Most broken or bruised ribs happen when you fall onto your chest or are hit on the chest. A severe cough or doing the same motion over and over can also cause bruised or broken ribs. It  can take 6 to 8 weeks for broken or bruised ribs to heal and be free of pain.  What care is needed at home?   Call your regular doctor to let them know you were in the ED. Make a follow-up appointment if you were told to.  Take 10 to 15 slow deep breaths at least 4 times each day. This will lower your chances of getting a lung infection. Use your incentive spirometer as you were told if you were given one. An incentive spirometer is a tool that measures how deeply you can breathe in.  Hold a pillow to your chest to ease the pain when you take deep breaths, sneeze, cough, or laugh. This will help ease the pain in your ribs.  It may hurt less to sleep in a reclined position. You can also sleep with your head and shoulders propped up on pillows.  You may want to take medicines like ibuprofen or naproxen for swelling and pain. These are nonsteroidal anti-inflammatory drugs (NSAIDS).  Ice may help you ease pain and swelling.  Place an ice pack or a bag of frozen vegetables wrapped in a towel over the painful part. Never put ice right on the skin. Do not leave the ice on more than 10 to 15 minutes at a time. Use for the first 24 to 48 hours after an injury.  If you smoke, try to quit. Broken bones take longer to heal if you smoke.  When do I need to get emergency help?   Call for an ambulance right away if:   It is getting harder and harder to breathe.  You are so short of breath you cannot talk in a full sentence.  You develop severe pain in your chest, back, or neck.  Return to the ED if:   You start to cough up blood or yellow or green mucus.  You have a fever of 100.4°F (38°C) or higher or chills.  You are very weak or light-headed or feel like you might pass out.  When do I need to call the doctor?   You have very bad pain that is not helped by your medicines.  You have new or worsening symptoms.  Last Reviewed Date   2020-09-16  Consumer Information Use and Disclaimer   This generalized information is a limited  summary of diagnosis, treatment, and/or medication information. It is not meant to be comprehensive and should be used as a tool to help the user understand and/or assess potential diagnostic and treatment options. It does NOT include all information about conditions, treatments, medications, side effects, or risks that may apply to a specific patient. It is not intended to be medical advice or a substitute for the medical advice, diagnosis, or treatment of a health care provider based on the health care provider's examination and assessment of a patient’s specific and unique circumstances. Patients must speak with a health care provider for complete information about their health, medical questions, and treatment options, including any risks or benefits regarding use of medications. This information does not endorse any treatments or medications as safe, effective, or approved for treating a specific patient. UpToDate, Inc. and its affiliates disclaim any warranty or liability relating to this information or the use thereof. The use of this information is governed by the Terms of Use, available at https://www.wolterskluwer.com/en/know/clinical-effectiveness-terms   Copyright   Copyright © 2024 UpToDate, Inc. and its affiliates and/or licensors. All rights reserved.

## 2025-06-02 ENCOUNTER — OFFICE VISIT (OUTPATIENT)
Dept: PODIATRY | Age: 60
End: 2025-06-02
Payer: COMMERCIAL

## 2025-06-02 ENCOUNTER — HOSPITAL ENCOUNTER (OUTPATIENT)
Dept: RADIOLOGY | Facility: CLINIC | Age: 60
Discharge: HOME | End: 2025-06-02
Attending: STUDENT IN AN ORGANIZED HEALTH CARE EDUCATION/TRAINING PROGRAM
Payer: COMMERCIAL

## 2025-06-02 VITALS — WEIGHT: 159.2 LBS | BODY MASS INDEX: 25.58 KG/M2 | HEIGHT: 66 IN

## 2025-06-02 DIAGNOSIS — M79.674 PAIN IN TOE OF RIGHT FOOT: ICD-10-CM

## 2025-06-02 DIAGNOSIS — E11.42 TYPE 2 DIABETES MELLITUS WITH DIABETIC POLYNEUROPATHY, WITHOUT LONG-TERM CURRENT USE OF INSULIN (HCC): ICD-10-CM

## 2025-06-02 DIAGNOSIS — L84 CALLUS: Primary | ICD-10-CM

## 2025-06-02 PROCEDURE — 73630 X-RAY EXAM OF FOOT: CPT

## 2025-06-02 PROCEDURE — 99213 OFFICE O/P EST LOW 20 MIN: CPT | Performed by: STUDENT IN AN ORGANIZED HEALTH CARE EDUCATION/TRAINING PROGRAM

## 2025-06-02 RX ORDER — FLUTICASONE PROPIONATE AND SALMETEROL 250; 50 UG/1; UG/1
POWDER RESPIRATORY (INHALATION)
COMMUNITY
Start: 2025-05-26

## 2025-06-02 RX ORDER — AMMONIUM LACTATE 12 G/100G
CREAM TOPICAL
Qty: 385 G | Refills: 3 | Status: SHIPPED | OUTPATIENT
Start: 2025-06-02

## 2025-06-02 NOTE — PATIENT INSTRUCTIONS
Daily donut pad plus amlactin or cream with urea    Wear supportive shoes at all times. Avoid flip-flops, flat sandals, barefoot walking (never walk barefoot, even at home). Generally avoid shoes that are too flexible and bend in the arch. Your shoes should only slightly bend in the toe area, not the middle. Running sneakers are often the best choice.  Supportive sneaker brands: Xenaveedo, Brad, Ulises, Judy, Rubin, Yadiel, [employer]igs  [place] [email or web address]   Fleet Feet [facility] [place]   [place] [employer] [place]  Supportive daily shoe brands: Vionic, Orthofeet, Jeanette, Dansko, Chacos, Rosenbaum, Teva, Birkenstock  Supportive home shoes: Rubin Nielsen (recovery slides)

## 2025-06-02 NOTE — ASSESSMENT & PLAN NOTE
"- DFE as below. Q7 findings.   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 RLE 1.10/101/73. LLE 1.24/111/58.  - Patient is eligible for RFC but defers at this time.   - f/u 1yr for DFE    No results found for: \"HGBA1C\"         "

## 2025-06-02 NOTE — PROGRESS NOTES
"Name: Meghan Oh      : 1965      MRN: [id number]  Encounter Provider: Marge Loo DPM  Encounter Date: 2025   Encounter department: [facility] [place]  :  Assessment & Plan  Callus  - Right 1st toe medial IPJ callus was pared in thickness to more normal epithelium. Pain improved. Donut pad, amlactin (rx'd), soft/wide toe box in well fit shoes rec    Orders:    ammonium lactate (LAC-HYDRIN) 12 % cream; Apply topically daily at bedtime To right toe callus    Pain in toe of right foot  - XR right foot reviewed and independently interpreted via PACs WB 3v 25: 1st MTPJ fusion and 2nd DIPJ fusion hardware look intact without issues.     Orders:    XR foot 3+ vw right; Future    Type 2 diabetes mellitus with diabetic polyneuropathy, without long-term current use of insulin (HCC)  - DFE as below. Q7 findings.   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 RLE 1.10/101/73. LLE 1.24/111/58.  - Patient is eligible for RFC but defers at this time.   - f/u 1yr for DFE    No results found for: \"HGBA1C\"             History of Present Illness   HPI  Meghan Oh is a 59 y.o. female who presents to clinic for right toe pain. Notes a spot on the toe which hurts,     Patient was last seen by me 23 (technically not a new patient). Was supposed to f/u in 6mo but did not. I rx'd custom DM shoes and inserts at that time as well but patient does not wear.   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   HENT: Negative.     Respiratory:  Negative for cough, chest tightness and shortness of breath.    Cardiovascular:  Negative for chest pain and leg swelling.   Endocrine: Negative.    Genitourinary: Negative.    Neurological: " "Negative.  Negative for numbness.   Psychiatric/Behavioral: Negative.  Negative for agitation and behavioral problems.           Objective   Ht 5' 6\" (1.676 m)   Wt 72.2 kg (159 lb 3.2 oz)   BMI 25.70 kg/m²      Physical Exam    Cardiovascular:      Pulses: no weak pulses.           Dorsalis pedis pulses are 1+ on the right side and 1+ on the left side.        Posterior tibial pulses are 1+ on the right side and 1+ on the left side.     Musculoskeletal:         General: Deformity (left partial 1st ray amp, 2nd PIPJ HT) present.   Feet:      Right foot:      Skin integrity: Callus (medial 1st toe IPJ) present. No ulcer, skin breakdown, erythema, warmth or dry skin.      Left foot:      Skin integrity: No ulcer, skin breakdown, erythema, warmth, callus or dry skin.         Diabetic Foot Exam    Patient's shoes and socks removed.    Right Foot/Ankle   Right Foot Inspection  Skin Exam: skin normal, skin intact, callus (medial 1st toe IPJ) and callus (medial 1st toe IPJ). No dry skin, no warmth, no erythema, no maceration, no abnormal color, no pre-ulcer and no ulcer.     Toe Exam: right toe deformity.     Sensory   Vibration: diminished  Proprioception: diminished  Monofilament testing: intact    Vascular  Capillary refills: < 3 seconds  The right DP pulse is 1+. The right PT pulse is 1+.     Left Foot/Ankle  Left Foot Inspection  Skin Exam: skin normal and skin intact. No dry skin, no warmth, no erythema, no maceration, normal color, no pre-ulcer, no ulcer and no callus.     Toe Exam: left toe deformity.     Sensory   Vibration: diminished  Proprioception: diminished  Monofilament testing: intact    Vascular  Capillary refills: < 3 seconds  The left DP pulse is 1+. The left PT pulse is 1+.     Assign Risk Category  Deformity present  No loss of protective sensation  No weak pulses  Risk: 3        "